# Patient Record
Sex: MALE | Race: WHITE | Employment: OTHER | URBAN - METROPOLITAN AREA
[De-identification: names, ages, dates, MRNs, and addresses within clinical notes are randomized per-mention and may not be internally consistent; named-entity substitution may affect disease eponyms.]

---

## 2017-03-15 ENCOUNTER — TELEPHONE (OUTPATIENT)
Dept: SURGERY | Facility: CLINIC | Age: 57
End: 2017-03-15

## 2017-03-15 DIAGNOSIS — L73.2 SUPPURATIVE HIDRADENITIS: Primary | ICD-10-CM

## 2017-03-15 NOTE — TELEPHONE ENCOUNTER
Reason for Call:  Other     Detailed comments: Pt has developed another cyst on tailbone that burst last night and is now draining. Also thinks he may have a low-grade temp - feeling better since it burst, but still not quite right. He has been soaking it. Wondering if he needs an antibiotic phoned in.     [facility]: [facility] [place]    Phone Number Patient can be reached at: Home number on file [phone] (home)    Best Time: Any    Can we leave a detailed message on this number? YES    Call taken on 3/15/2017 at 11:37 AM by Denise Behrendt

## 2017-03-15 NOTE — TELEPHONE ENCOUNTER
"Called and spoke with pt regarding cyst located near \"tailbone to the left\".     S-(situation): Re occurring cyst on/off for a couple weeks. Drainage yesterday-\"gross pus\" which turned to blood then clear, low grade temp yesterday with chills.    B-(background): Cyst removal last year 3/18/16 by Dr Mcneil.     A-(assessment): Afebrile today and feeling better. Pt states he has been doing epsom salt baths which has improved symptoms. Pt requesting antibiotic or further recommendations.    R-(recommendations): Instructed pt to seek emergency care if fever or other signs of infection occur.     Please review and advise.   Shey JOLLY RN  Specialty [employer]       "

## 2017-03-16 NOTE — TELEPHONE ENCOUNTER
This was just infected scar tissue last year. Would recommend a trial of Augmentin for 1 week. I sent the prescription to [facility] in [place].    Haider Mcneil MD

## 2017-03-16 NOTE — TELEPHONE ENCOUNTER
I spoke to John and let him know Dr Mcneil's message. The patient wanted the Rx to go to [facility] so I called it [facility]. rk JEWELL RN

## 2017-04-18 ENCOUNTER — TELEPHONE (OUTPATIENT)
Dept: FAMILY MEDICINE | Facility: CLINIC | Age: 57
End: 2017-04-18

## 2017-04-19 ENCOUNTER — OFFICE VISIT (OUTPATIENT)
Dept: FAMILY MEDICINE | Facility: CLINIC | Age: 57
End: 2017-04-19
Payer: COMMERCIAL

## 2017-04-19 ENCOUNTER — ANESTHESIA EVENT (OUTPATIENT)
Dept: SURGERY | Facility: CLINIC | Age: 57
End: 2017-04-19
Payer: COMMERCIAL

## 2017-04-19 ENCOUNTER — OFFICE VISIT (OUTPATIENT)
Dept: SURGERY | Facility: CLINIC | Age: 57
End: 2017-04-19
Payer: COMMERCIAL

## 2017-04-19 VITALS
DIASTOLIC BLOOD PRESSURE: 80 MMHG | WEIGHT: 212 LBS | SYSTOLIC BLOOD PRESSURE: 122 MMHG | HEART RATE: 72 BPM | TEMPERATURE: 98.1 F | BODY MASS INDEX: 28.71 KG/M2 | HEIGHT: 72 IN

## 2017-04-19 VITALS
DIASTOLIC BLOOD PRESSURE: 80 MMHG | HEIGHT: 72 IN | TEMPERATURE: 98.9 F | BODY MASS INDEX: 28.71 KG/M2 | WEIGHT: 212 LBS | SYSTOLIC BLOOD PRESSURE: 118 MMHG

## 2017-04-19 DIAGNOSIS — Z13.6 CARDIOVASCULAR SCREENING; LDL GOAL LESS THAN 130: Primary | ICD-10-CM

## 2017-04-19 DIAGNOSIS — Z13.1 SCREENING FOR DIABETES MELLITUS: ICD-10-CM

## 2017-04-19 DIAGNOSIS — Z01.810 PREOP CARDIOVASCULAR EXAM: Primary | ICD-10-CM

## 2017-04-19 DIAGNOSIS — L05.91 PILONIDAL CYST: ICD-10-CM

## 2017-04-19 DIAGNOSIS — K61.0 PERIANAL ABSCESS: ICD-10-CM

## 2017-04-19 LAB
ANION GAP SERPL CALCULATED.3IONS-SCNC: 6 MMOL/L (ref 3–14)
BUN SERPL-MCNC: 20 MG/DL (ref 7–30)
CALCIUM SERPL-MCNC: 8.9 MG/DL (ref 8.5–10.1)
CHLORIDE SERPL-SCNC: 106 MMOL/L (ref 94–109)
CHOLEST SERPL-MCNC: 201 MG/DL
CO2 SERPL-SCNC: 28 MMOL/L (ref 20–32)
CREAT SERPL-MCNC: 0.99 MG/DL (ref 0.66–1.25)
GFR SERPL CREATININE-BSD FRML MDRD: 78 ML/MIN/1.7M2
GLUCOSE SERPL-MCNC: 87 MG/DL (ref 70–99)
GRAM STN SPEC: ABNORMAL
HDLC SERPL-MCNC: 51 MG/DL
LDLC SERPL CALC-MCNC: 124 MG/DL
Lab: ABNORMAL
MICRO REPORT STATUS: ABNORMAL
NONHDLC SERPL-MCNC: 150 MG/DL
POTASSIUM SERPL-SCNC: 3.9 MMOL/L (ref 3.4–5.3)
SODIUM SERPL-SCNC: 140 MMOL/L (ref 133–144)
SPECIMEN SOURCE: ABNORMAL
TRIGL SERPL-MCNC: 128 MG/DL

## 2017-04-19 PROCEDURE — 87070 CULTURE OTHR SPECIMN AEROBIC: CPT | Performed by: NURSE PRACTITIONER

## 2017-04-19 PROCEDURE — 87205 SMEAR GRAM STAIN: CPT | Performed by: NURSE PRACTITIONER

## 2017-04-19 PROCEDURE — 36415 COLL VENOUS BLD VENIPUNCTURE: CPT | Performed by: NURSE PRACTITIONER

## 2017-04-19 PROCEDURE — 87077 CULTURE AEROBIC IDENTIFY: CPT | Performed by: NURSE PRACTITIONER

## 2017-04-19 PROCEDURE — 99214 OFFICE O/P EST MOD 30 MIN: CPT | Performed by: SURGERY

## 2017-04-19 PROCEDURE — 99213 OFFICE O/P EST LOW 20 MIN: CPT | Performed by: NURSE PRACTITIONER

## 2017-04-19 PROCEDURE — 87186 SC STD MICRODIL/AGAR DIL: CPT | Performed by: NURSE PRACTITIONER

## 2017-04-19 PROCEDURE — 80061 LIPID PANEL: CPT | Performed by: NURSE PRACTITIONER

## 2017-04-19 PROCEDURE — 80048 BASIC METABOLIC PNL TOTAL CA: CPT | Performed by: NURSE PRACTITIONER

## 2017-04-19 NOTE — MR AVS SNAPSHOT
After Visit Summary   4/19/2017    John Kinsey    MRN: [id number]           Patient Information     Date Of Birth          1960        Visit Information        Provider Department      4/19/2017 8:00 AM Ana Gaytan APRN CNP [facility]        Today's Diagnoses     CARDIOVASCULAR SCREENING; LDL GOAL LESS THAN 130    -  1    Screening for diabetes mellitus          Care Instructions      1. Labs today    Thank you for choosing [facility].  You may be receiving a survey in the mail from Obinna Torres regarding your visit today.  Please take a few minutes to complete and return the survey to let us know how we are doing.      If you have questions or concerns, please contact us via Encore Alert or you can contact your care team at [phone].    Our Clinic hours are:  Monday 6:40 am  to 7:00 pm  Tuesday -Friday 6:40 am to 5:00 pm    The [place] outpatient lab hours are:  Monday - Friday 6:10 am to 4:45 pm  Saturdays 7:00 am to 11:00 am  Appointments are required, call [phone]    If you have clinical questions after hours or would like to schedule an appointment,  call the clinic at [phone].        Follow-ups after your visit        Who to contact     If you have questions or need follow up information about today's clinic visit or your schedule please contact [facility] directly at [phone].  Normal or non-critical lab and imaging results will be communicated to you by ProtoStarhart, letter or phone within 4 business days after the clinic has received the results. If you do not hear from us within 7 days, please contact the clinic through [employer] or phone. If you have a critical or abnormal lab result, we will notify you by phone as soon as possible.  Submit refill requests through En[employer] Alert or call your pharmacy and they will forward the refill request to us. Please allow 3 business days for your refill to be completed.          Additional Information About  "Your Visit        MyChart Information     [employer] lets you send messages to your doctor, view your test results, renew your prescriptions, schedule appointments and more. To sign up, go to [email or web address] . Click on \"Log in\" on the left side of the screen, which will take you to the Welcome page. Then click on \"Sign up Now\" on the right side of the page.     You will be asked to enter the access code listed below, as well as some personal information. Please follow the directions to create your username and password.     Your access code is: [email or web address]  Expires: 2017  8:25 AM     Your access code will  in 90 days. If you need help or a new code, please call your [facility] clinic or [phone].        Care EveryWhere ID     This is your Care EveryWhere ID. This could be used by other organizations to access your [place] medical records  [id number]        Your Vitals Were     Temperature Height BMI (Body Mass Index)             98.9  F (37.2  C) (Tympanic) 6' (1.829 m) 28.75 kg/m2          Blood Pressure from Last 3 Encounters:   17 118/80   16 124/77   16 124/81    Weight from Last 3 Encounters:   17 212 lb (96.2 kg)   16 212 lb (96.2 kg)   10/13/15 211 lb (95.7 kg)              We Performed the Following     Basic metabolic panel     Lipid Profile with reflex to direct LDL        Primary Care Provider Office Phone # Fax #    Indira Maya [phone]       [facility] [address] [address]        Thank you!     Thank you for choosing [facility]  for your care. Our goal is always to provide you with excellent care. Hearing back from our patients is one way we can continue to improve our services. Please take a few minutes to complete the written survey that you may receive in the mail after your visit with us. Thank you!             Your Updated Medication List - Protect others around you: Learn how to safely use, " store and throw away your medicines at www.disposemymeds.org.          This list is accurate as of: 4/19/17  8:25 AM.  Always use your most recent med list.                   Brand Name Dispense Instructions for use    amoxicillin-clavulanate 875-125 MG per tablet    AUGMENTIN    14 tablet    Take 1 tablet by mouth 2 times daily

## 2017-04-19 NOTE — NURSING NOTE
Initial /80 (BP Location: Left arm, Patient Position: Chair, Cuff Size: Adult Large)  Temp 98.9  F (37.2  C) (Tympanic)  Ht 6' (1.829 m)  Wt 212 lb (96.2 kg)  BMI 28.75 kg/m2 Estimated body mass index is 28.75 kg/(m^2) as calculated from the following:    Height as of this encounter: 6' (1.829 m).    Weight as of this encounter: 212 lb (96.2 kg). .    Ama Elizabeth

## 2017-04-19 NOTE — PROGRESS NOTES
56-year-old male complaining of perianal drainage off and on for the past several weeks. Patient reports he will have some irritation in the area of posterior midline and it will spontaneously drain. It causes some localized discomfort. Patient has history of suppurative hidradenitis    Patient Active Problem List   Diagnosis     Anal fissure       Past Medical History:   Diagnosis Date     Anal fistula 3/20/13     PONV (postoperative nausea and vomiting)        Past Surgical History:   Procedure Laterality Date     COLONOSCOPY  4/11/2013    Procedure: COLONOSCOPY;  Diagnostic Colonoscopy,Anal Exam, Anal Fistulotomy ;  Surgeon: Haider Deleon MD;  Location: [facility] OR     ENT SURGERY      T&A     EXCISE HIDRADENITIS (LOCATION)  10/10/2013    Procedure: EXCISE HIDRADENITIS (LOCATION);  Excision of bilateral axillary scarring;  Surgeon: Haiedr Mcneil MD;  Location: [facility] OR     FISTULOTOMY RECTUM  4/11/2013    Procedure: FISTULOTOMY RECTUM;;  Surgeon: Haider Deleon MD;  Location: [facility] OR     HYDROCELECTOMY SCROTAL N/A 10/13/2015    Procedure: HYDROCELECTOMY SCROTAL;  Surgeon: VINCENT Huber MD;  Location: [facility] OR     ORTHOPEDIC SURGERY  3/24/12    Left shoulder surgery       Family History   Problem Relation Age of Onset     Colon Polyps Father      Colon Cancer No family hx of      Crohn Disease No family hx of      Ulcerative Colitis No family hx of      Anesthesia Reaction No family hx of        Social History   Substance Use Topics     Smoking status: Former Smoker     Types: Cigarettes     Smokeless tobacco: Never Used     Alcohol use Yes      Comment: social        Smoking - Counseled patient on the effects of smoking on surgical issues such as wound healing and infection rates.    History   Drug Use No       Current Outpatient Prescriptions   Medication Sig Dispense Refill     amoxicillin-clavulanate (AUGMENTIN) 875-125 MG per tablet Take 1 tablet by mouth 2 times daily for 7 days 14 tablet 0      amoxicillin-clavulanate (AUGMENTIN) 875-125 MG per tablet Take 1 tablet by mouth 2 times daily (Patient not taking: Reported on 4/19/2017) 14 tablet 0       Allergies   Allergen Reactions     Nkda [No Known Drug Allergies]        ROS  Constitutional - Denies fevers, weight loss, malaise, lethargy  Neuro - Denies tremors or seizures  Pulmon - Denies SOB, dyspnea, hemoptysis, chronic cough or use of an inhaler  CV - Denies CP, SOB, lower extremity edema, difficulty w/ stairs, has never used NTG  GI - Denies hematemesis, BRBPR, melena, chronic diarrhea or epigastric pain   - Denies hematuria, difficulty voiding, h/o STDs  Hematology - Denies blood clotting disorders, chronic anemias  Dermatology - No melanomas or skin cancers  Rheumatology - No h/o RA  Pysch - Denies depression, bipolar d/o or schizophrenia    Exam:  AXO3 NAD  Neuro - Strength 5/5 all major groups, sensation intact, PERRL  Lungs - CTA  CV - RRR  Abd - Soft, non-distended, non-tender, +BS  Rectal-areas of scarring in around posterior anus with possible pit. Could not express purulence  Extr - No edema    Assessment and plan: 56-year-old male with history consistent with perianal fistula. This is consistent with patient's history of hidradenitis. Patient would benefit from a rectal exam under anesthesia and possible fistulotomy. Risks benefits alternatives and complications were discussed with the patient including the possibility of infection bleeding or recurrence. Patient understood and wished to proceed. I have ordered an EKG. PATIENT IS CLEARED FOR SURGERY.    Haider Mcneil MD

## 2017-04-19 NOTE — MR AVS SNAPSHOT
"              After Visit Summary   2017    John Kinsey    MRN: [id number]           Patient Information     Date Of Birth          1960        Visit Information        Provider Department      2017 9:00 AM Haider Mcneil MD [facility]        Today's Diagnoses     Preop cardiovascular exam    -  1    Perianal abscess          Care Instructions    Per Dr. Mcneil's instructions        Follow-ups after your visit        Who to contact     If you have questions or need follow up information about today's clinic visit or your schedule please contact [facility] directly at [phone].  Normal or non-critical lab and imaging results will be communicated to you by Plehn Analyticshart, letter or phone within 4 business days after the clinic has received the results. If you do not hear from us within 7 days, please contact the clinic through [employer] or phone. If you have a critical or abnormal lab result, we will notify you by phone as soon as possible.  Submit refill requests through [employer] or call your pharmacy and they will forward the refill request to us. Please allow 3 business days for your refill to be completed.          Additional Information About Your Visit        MyChart Information     [employer] lets you send messages to your doctor, view your test results, renew your prescriptions, schedule appointments and more. To sign up, go to [email or web address] . Click on \"Log in\" on the left side of the screen, which will take you to the Welcome page. Then click on \"Sign up Now\" on the right side of the page.     You will be asked to enter the access code listed below, as well as some personal information. Please follow the directions to create your username and password.     Your access code is: [email or web address]  Expires: 2017  8:25 AM     Your access code will  in 90 days. If you need help or a new code, please call your [facility] or [phone].        Care EveryWhere " ID     This is your Care EveryWhere ID. This could be used by other organizations to access your [place] medical records  [id number]        Your Vitals Were     Pulse Temperature Height BMI (Body Mass Index)          72 98.1  F (36.7  C) (Oral) 1.829 m (6') 28.75 kg/m2         Blood Pressure from Last 3 Encounters:   04/21/17 118/60   04/19/17 122/80   04/19/17 118/80    Weight from Last 3 Encounters:   04/21/17 95.3 kg (210 lb)   04/19/17 96.2 kg (212 lb)   04/19/17 96.2 kg (212 lb)              We Performed the Following     EKG 12-lead complete w/read - Clinics        Primary Care Provider Office Phone # Fax #    Indira Prince Francesco [phone]       [facility] [address] [address]        Thank you!     Thank you for choosing [facility]  for your care. Our goal is always to provide you with excellent care. Hearing back from our patients is one way we can continue to improve our services. Please take a few minutes to complete the written survey that you may receive in the mail after your visit with us. Thank you!             Your Updated Medication List - Protect others around you: Learn how to safely use, store and throw away your medicines at www.disposemymeds.org.          This list is accurate as of: 4/19/17 11:59 PM.  Always use your most recent med list.                   Brand Name Dispense Instructions for use    amoxicillin-clavulanate 875-125 MG per tablet    AUGMENTIN         * HYDROcodone-acetaminophen  MG per tablet    NORCO     TAKE 1 TABLET BY MOUTH EVERY 6 HOURS AS NEEDED FOR PAIN       * HYDROcodone-acetaminophen  MG per tablet    NORCO    45 tablet    Take 1-2 tablets by mouth every 6 hours as needed for moderate to severe pain       * Notice:  This list has 2 medication(s) that are the same as other medications prescribed for you. Read the directions carefully, and ask your doctor or other care provider to review them with you.

## 2017-04-19 NOTE — NURSING NOTE
Initial /80 (BP Location: Right arm, Patient Position: Chair, Cuff Size: Adult Regular)  Pulse 72  Temp 98.1  F (36.7  C) (Oral)  Ht 1.829 m (6')  Wt 96.2 kg (212 lb)  BMI 28.75 kg/m2 Estimated body mass index is 28.75 kg/(m^2) as calculated from the following:    Height as of this encounter: 1.829 m (6').    Weight as of this encounter: 96.2 kg (212 lb). .    Claudia Chino MA

## 2017-04-19 NOTE — PATIENT INSTRUCTIONS
1. Labs today    Thank you for choosing [facility].  You may be receiving a survey in the mail from Obinna Torres regarding your visit today.  Please take a few minutes to complete and return the survey to let us know how we are doing.      If you have questions or concerns, please contact us via [employer] or you can contact your care team at [phone].    Our Clinic hours are:  Monday 6:40 am  to 7:00 pm  Tuesday -Friday 6:40 am to 5:00 pm    The [place] outpatient lab hours are:  Monday - Friday 6:10 am to 4:45 pm  Saturdays 7:00 am to 11:00 am  Appointments are required, call [phone]    If you have clinical questions after hours or would like to schedule an appointment,  call the clinic at [phone].

## 2017-04-19 NOTE — PROGRESS NOTES
SUBJECTIVE:                                                    John Kinsey is a 56 year old male who presents to clinic today for the following health issues:    Patient with history of pilonidal cyst - was seen by Dr. Deleon for fistula in 2013. He states that cyst continue to come back intermittently. Cyst is hard and will drain at times. He completed antibiotics called in for him from Dr. Mcneil. Symptoms are still present after completing antibiotics however they are improved. History of hidradenitis.     Patient would like to be checked for diabetes and cholesterol.       -------------------------------------    Problem list and histories reviewed & adjusted, as indicated.  Additional history: as documented    Patient Active Problem List   Diagnosis     Anal fissure     Past Surgical History:   Procedure Laterality Date     COLONOSCOPY  4/11/2013    Procedure: COLONOSCOPY;  Diagnostic Colonoscopy,Anal Exam, Anal Fistulotomy ;  Surgeon: Haider Deleon MD;  Location: [facility] OR     ENT SURGERY      T&A     EXCISE HIDRADENITIS (LOCATION)  10/10/2013    Procedure: EXCISE HIDRADENITIS (LOCATION);  Excision of bilateral axillary scarring;  Surgeon: Haider Mcneil MD;  Location: [facility] OR     FISTULOTOMY RECTUM  4/11/2013    Procedure: FISTULOTOMY RECTUM;;  Surgeon: Haider Deleon MD;  Location: [facility]     HYDROCELECTOMY SCROTAL N/A 10/13/2015    Procedure: HYDROCELECTOMY SCROTAL;  Surgeon: VINCENT Huber MD;  Location: [facility] OR     ORTHOPEDIC SURGERY  3/24/12    Left shoulder surgery       Social History   Substance Use Topics     Smoking status: Former Smoker     Types: Cigarettes     Smokeless tobacco: Never Used     Alcohol use Yes      Comment: social     Family History   Problem Relation Age of Onset     Colon Polyps Father      Colon Cancer No family hx of      Crohn Disease No family hx of      Ulcerative Colitis No family hx of      Anesthesia Reaction No family hx of            Reviewed and  updated as needed this visit by clinical staff       Reviewed and updated as needed this visit by Provider         ROS:  Constitutional, HEENT, cardiovascular, pulmonary, GI, , musculoskeletal, neuro, skin, endocrine and psych systems are negative, except as otherwise noted.    OBJECTIVE:                                                    /80 (BP Location: Left arm, Patient Position: Chair, Cuff Size: Adult Large)  Temp 98.9  F (37.2  C) (Tympanic)  Ht 6' (1.829 m)  Wt 212 lb (96.2 kg)  BMI 28.75 kg/m2  Body mass index is 28.75 kg/(m^2).  GENERAL: healthy, alert and no distress  RESP: unlabored  RECTAL (male): left gluteal/ near anus with tender internal cyst.   MS: no gross musculoskeletal defects noted, no edema    Diagnostic Test Results:  none      ASSESSMENT/PLAN:                                                        1. Pilonidal cyst  Appointment made today with Dr. Mcneil for possible removal of cyst-   - amoxicillin-clavulanate (AUGMENTIN) 875-125 MG per tablet; Take 1 tablet by mouth 2 times daily for 7 days  Dispense: 14 tablet; Refill: 0  - Wound Culture Aerobic Bacterial  - Gram stain    2. CARDIOVASCULAR SCREENING; LDL GOAL LESS THAN 130    - Lipid Profile with reflex to direct LDL    3. Screening for diabetes mellitus  + family history of diabetes  - Basic metabolic panel        BERNARDO Trammell [facility]

## 2017-04-19 NOTE — LETTER
SURGERYPLANNING/SCHEDULING WORKSHEET                              [facility] [place]  [address] [address] [phone]                          John Kinsey                :  1960  MRN:  [id number]  Phone: [phone] (home)     Same Day Surgery   Surgeon: Haider Mcneil MD  Diagnosis:   Anal fistula  Allergies:  Nkda [no known drug allergies]   A preoperative evaluation and physical will be done by this office.   ====================================================  Surgical Procedure:  General Surgery:recatl exam under anesthesia and fistulotomy  Length of Procedure:  30 minutes  Type of anesthesia:  General  The proposed surgical procedure is considered LOW risk.  Date of Procedure:___17_____________    Time: __10am  ___________________       Special Equipment: None  Informed Consent Obtained and Signed:  NO  ====================================================  Instructions to Same Day Surgery Staff  Pneumoboots  Preop Antibiotic:  Ancef 2 gm IV  pre-op within one hour prior to incision For > 80 kg  Preop Pain Meds:  None  Preop Orders:  Routine Standing Orders.  ====================================================  Instructions to the patient:  Preop physical exam scheduled (within 30 days or 7 days prior) with:  Dr. Escudero update___________________  Clinic:  ____________________                                         Date______________Time_________________________  Come to the hospital at: __ONE HOUR PRIOR- 9am  _____________________________  HOME PREPARATION:   Shower with Hibiclens the night before or the morning of surgery, gently cleaning skin from neck to feet  Bathe and brush teeth the morning of surgery.  Take medications with a sip of water the morning of surgery:   Check with  if taking insulin.  May have  a light meal, toast and clear liquids, up to 8 hrs before surgery  May have clear liquids  (liquids one can read through) up to 4 hrs before surgery  NOTHING after 4 hrs before surgery  Stop aspirin 7-10 days before surgery  Stop NSAIDS (Ibuproven, Naproxen, etc) 5 days before surgery  Stop Plavix 7-10 days before surgery      Haider Mcneil MD    4/19/2017  This form was electronically signed at chart closure                                                                        Chart Copy

## 2017-04-21 ENCOUNTER — ANESTHESIA (OUTPATIENT)
Dept: SURGERY | Facility: CLINIC | Age: 57
End: 2017-04-21
Payer: COMMERCIAL

## 2017-04-21 ENCOUNTER — HOSPITAL ENCOUNTER (OUTPATIENT)
Facility: CLINIC | Age: 57
Discharge: HOME | End: 2017-04-21
Attending: SURGERY | Admitting: SURGERY
Payer: COMMERCIAL

## 2017-04-21 ENCOUNTER — SURGERY (OUTPATIENT)
Age: 57
End: 2017-04-21

## 2017-04-21 VITALS
SYSTOLIC BLOOD PRESSURE: 118 MMHG | DIASTOLIC BLOOD PRESSURE: 60 MMHG | HEART RATE: 78 BPM | TEMPERATURE: 97.9 F | RESPIRATION RATE: 20 BRPM | WEIGHT: 210 LBS | BODY MASS INDEX: 27.83 KG/M2 | HEIGHT: 73 IN | OXYGEN SATURATION: 95 %

## 2017-04-21 DIAGNOSIS — G89.18 POST-OP PAIN: Primary | ICD-10-CM

## 2017-04-21 LAB
BACTERIA SPEC CULT: ABNORMAL
Lab: ABNORMAL
MICRO REPORT STATUS: ABNORMAL
MICROORGANISM SPEC CULT: ABNORMAL
SPECIMEN SOURCE: ABNORMAL

## 2017-04-21 PROCEDURE — 25000128 H RX IP 250 OP 636: Performed by: SURGERY

## 2017-04-21 PROCEDURE — 25000132 ZZH RX MED GY IP 250 OP 250 PS 637: Performed by: SURGERY

## 2017-04-21 PROCEDURE — 25000125 ZZHC RX 250: Performed by: NURSE ANESTHETIST, CERTIFIED REGISTERED

## 2017-04-21 PROCEDURE — 27210794 ZZH OR GENERAL SUPPLY STERILE: Performed by: SURGERY

## 2017-04-21 PROCEDURE — 71000027 ZZH RECOVERY PHASE 2 EACH 15 MINS: Performed by: SURGERY

## 2017-04-21 PROCEDURE — 36000052 ZZH SURGERY LEVEL 2 EA 15 ADDTL MIN: Performed by: SURGERY

## 2017-04-21 PROCEDURE — 25800025 ZZH RX 258: Performed by: NURSE ANESTHETIST, CERTIFIED REGISTERED

## 2017-04-21 PROCEDURE — 46270 REMOVE ANAL FIST SUBQ: CPT | Performed by: SURGERY

## 2017-04-21 PROCEDURE — 27110028 ZZH OR GENERAL SUPPLY NON-STERILE: Performed by: SURGERY

## 2017-04-21 PROCEDURE — 37000008 ZZH ANESTHESIA TECHNICAL FEE, 1ST 30 MIN: Performed by: SURGERY

## 2017-04-21 PROCEDURE — 40000305 ZZH STATISTIC PRE PROC ASSESS I: Performed by: SURGERY

## 2017-04-21 PROCEDURE — C9290 INJ, BUPIVACAINE LIPOSOME: HCPCS | Performed by: SURGERY

## 2017-04-21 PROCEDURE — 36000050 ZZH SURGERY LEVEL 2 1ST 30 MIN: Performed by: SURGERY

## 2017-04-21 PROCEDURE — 37000009 ZZH ANESTHESIA TECHNICAL FEE, EACH ADDTL 15 MIN: Performed by: SURGERY

## 2017-04-21 PROCEDURE — 71000012 ZZH RECOVERY PHASE 1 LEVEL 1 FIRST HR: Performed by: SURGERY

## 2017-04-21 PROCEDURE — 25000128 H RX IP 250 OP 636: Performed by: NURSE ANESTHETIST, CERTIFIED REGISTERED

## 2017-04-21 RX ORDER — CEFAZOLIN SODIUM 1 G/3ML
1 INJECTION, POWDER, FOR SOLUTION INTRAMUSCULAR; INTRAVENOUS SEE ADMIN INSTRUCTIONS
Status: DISCONTINUED | OUTPATIENT
Start: 2017-04-21 | End: 2017-04-21 | Stop reason: HOSPADM

## 2017-04-21 RX ORDER — MEPERIDINE HYDROCHLORIDE 25 MG/ML
12.5 INJECTION INTRAMUSCULAR; INTRAVENOUS; SUBCUTANEOUS
Status: DISCONTINUED | OUTPATIENT
Start: 2017-04-21 | End: 2017-04-21 | Stop reason: HOSPADM

## 2017-04-21 RX ORDER — DEXAMETHASONE SODIUM PHOSPHATE 4 MG/ML
4 INJECTION, SOLUTION INTRA-ARTICULAR; INTRALESIONAL; INTRAMUSCULAR; INTRAVENOUS; SOFT TISSUE EVERY 10 MIN PRN
Status: DISCONTINUED | OUTPATIENT
Start: 2017-04-21 | End: 2017-04-21 | Stop reason: HOSPADM

## 2017-04-21 RX ORDER — PHYSOSTIGMINE SALICYLATE 1 MG/ML
1.2 INJECTION INTRAVENOUS
Status: DISCONTINUED | OUTPATIENT
Start: 2017-04-21 | End: 2017-04-21 | Stop reason: HOSPADM

## 2017-04-21 RX ORDER — GLYCOPYRROLATE 0.2 MG/ML
INJECTION, SOLUTION INTRAMUSCULAR; INTRAVENOUS PRN
Status: DISCONTINUED | OUTPATIENT
Start: 2017-04-21 | End: 2017-04-21

## 2017-04-21 RX ORDER — METOCLOPRAMIDE 10 MG/1
10 TABLET ORAL EVERY 6 HOURS PRN
Status: DISCONTINUED | OUTPATIENT
Start: 2017-04-21 | End: 2017-04-21 | Stop reason: HOSPADM

## 2017-04-21 RX ORDER — PROPOFOL 10 MG/ML
INJECTION, EMULSION INTRAVENOUS CONTINUOUS PRN
Status: DISCONTINUED | OUTPATIENT
Start: 2017-04-21 | End: 2017-04-21

## 2017-04-21 RX ORDER — HYDROCODONE BITARTRATE AND ACETAMINOPHEN 10; 325 MG/1; MG/1
1-2 TABLET ORAL EVERY 6 HOURS PRN
Qty: 45 TABLET | Refills: 0 | Status: SHIPPED | OUTPATIENT
Start: 2017-04-21 | End: 2017-10-11

## 2017-04-21 RX ORDER — SODIUM CHLORIDE, SODIUM LACTATE, POTASSIUM CHLORIDE, CALCIUM CHLORIDE 600; 310; 30; 20 MG/100ML; MG/100ML; MG/100ML; MG/100ML
INJECTION, SOLUTION INTRAVENOUS CONTINUOUS
Status: DISCONTINUED | OUTPATIENT
Start: 2017-04-21 | End: 2017-04-21 | Stop reason: HOSPADM

## 2017-04-21 RX ORDER — ONDANSETRON 2 MG/ML
4 INJECTION INTRAMUSCULAR; INTRAVENOUS EVERY 30 MIN PRN
Status: DISCONTINUED | OUTPATIENT
Start: 2017-04-21 | End: 2017-04-21 | Stop reason: HOSPADM

## 2017-04-21 RX ORDER — HYDROCODONE BITARTRATE AND ACETAMINOPHEN 10; 325 MG/1; MG/1
TABLET ORAL
Refills: 0 | COMMUNITY
Start: 2017-03-30 | End: 2017-10-11

## 2017-04-21 RX ORDER — HYDROMORPHONE HYDROCHLORIDE 1 MG/ML
.3-.5 INJECTION, SOLUTION INTRAMUSCULAR; INTRAVENOUS; SUBCUTANEOUS EVERY 10 MIN PRN
Status: DISCONTINUED | OUTPATIENT
Start: 2017-04-21 | End: 2017-04-21 | Stop reason: HOSPADM

## 2017-04-21 RX ORDER — PROMETHAZINE HYDROCHLORIDE 25 MG/ML
12.5 INJECTION, SOLUTION INTRAMUSCULAR; INTRAVENOUS
Status: DISCONTINUED | OUTPATIENT
Start: 2017-04-21 | End: 2017-04-21 | Stop reason: HOSPADM

## 2017-04-21 RX ORDER — ONDANSETRON 4 MG/1
4 TABLET, ORALLY DISINTEGRATING ORAL EVERY 30 MIN PRN
Status: DISCONTINUED | OUTPATIENT
Start: 2017-04-21 | End: 2017-04-21 | Stop reason: HOSPADM

## 2017-04-21 RX ORDER — HYDROCODONE BITARTRATE AND ACETAMINOPHEN 5; 325 MG/1; MG/1
1-2 TABLET ORAL EVERY 6 HOURS PRN
Status: DISCONTINUED | OUTPATIENT
Start: 2017-04-21 | End: 2017-04-21 | Stop reason: HOSPADM

## 2017-04-21 RX ORDER — FENTANYL CITRATE 50 UG/ML
INJECTION, SOLUTION INTRAMUSCULAR; INTRAVENOUS PRN
Status: DISCONTINUED | OUTPATIENT
Start: 2017-04-21 | End: 2017-04-21

## 2017-04-21 RX ORDER — NEOSTIGMINE METHYLSULFATE 1 MG/ML
VIAL (ML) INJECTION PRN
Status: DISCONTINUED | OUTPATIENT
Start: 2017-04-21 | End: 2017-04-21

## 2017-04-21 RX ORDER — CEFAZOLIN SODIUM 2 G/100ML
2 INJECTION, SOLUTION INTRAVENOUS
Status: COMPLETED | OUTPATIENT
Start: 2017-04-21 | End: 2017-04-21

## 2017-04-21 RX ORDER — NALOXONE HYDROCHLORIDE 0.4 MG/ML
.1-.4 INJECTION, SOLUTION INTRAMUSCULAR; INTRAVENOUS; SUBCUTANEOUS
Status: DISCONTINUED | OUTPATIENT
Start: 2017-04-21 | End: 2017-04-21 | Stop reason: HOSPADM

## 2017-04-21 RX ORDER — ONDANSETRON 2 MG/ML
INJECTION INTRAMUSCULAR; INTRAVENOUS PRN
Status: DISCONTINUED | OUTPATIENT
Start: 2017-04-21 | End: 2017-04-21

## 2017-04-21 RX ORDER — LIDOCAINE HYDROCHLORIDE 10 MG/ML
INJECTION, SOLUTION INFILTRATION; PERINEURAL PRN
Status: DISCONTINUED | OUTPATIENT
Start: 2017-04-21 | End: 2017-04-21

## 2017-04-21 RX ORDER — LIDOCAINE 40 MG/G
CREAM TOPICAL
Status: DISCONTINUED | OUTPATIENT
Start: 2017-04-21 | End: 2017-04-21 | Stop reason: HOSPADM

## 2017-04-21 RX ORDER — PROPOFOL 10 MG/ML
INJECTION, EMULSION INTRAVENOUS PRN
Status: DISCONTINUED | OUTPATIENT
Start: 2017-04-21 | End: 2017-04-21

## 2017-04-21 RX ORDER — SCOLOPAMINE TRANSDERMAL SYSTEM 1 MG/1
1 PATCH, EXTENDED RELEASE TRANSDERMAL ONCE
Status: COMPLETED | OUTPATIENT
Start: 2017-04-21 | End: 2017-04-21

## 2017-04-21 RX ORDER — DEXAMETHASONE SODIUM PHOSPHATE 4 MG/ML
INJECTION, SOLUTION INTRA-ARTICULAR; INTRALESIONAL; INTRAMUSCULAR; INTRAVENOUS; SOFT TISSUE PRN
Status: DISCONTINUED | OUTPATIENT
Start: 2017-04-21 | End: 2017-04-21

## 2017-04-21 RX ORDER — METOCLOPRAMIDE HYDROCHLORIDE 5 MG/ML
10 INJECTION INTRAMUSCULAR; INTRAVENOUS EVERY 6 HOURS PRN
Status: DISCONTINUED | OUTPATIENT
Start: 2017-04-21 | End: 2017-04-21 | Stop reason: HOSPADM

## 2017-04-21 RX ORDER — ALBUTEROL SULFATE 0.83 MG/ML
2.5 SOLUTION RESPIRATORY (INHALATION) EVERY 4 HOURS PRN
Status: DISCONTINUED | OUTPATIENT
Start: 2017-04-21 | End: 2017-04-21 | Stop reason: HOSPADM

## 2017-04-21 RX ORDER — FENTANYL CITRATE 50 UG/ML
25-50 INJECTION, SOLUTION INTRAMUSCULAR; INTRAVENOUS
Status: DISCONTINUED | OUTPATIENT
Start: 2017-04-21 | End: 2017-04-21 | Stop reason: HOSPADM

## 2017-04-21 RX ADMIN — PROPOFOL 200 MCG/KG/MIN: 10 INJECTION, EMULSION INTRAVENOUS at 10:11

## 2017-04-21 RX ADMIN — PROPOFOL 200 MG: 10 INJECTION, EMULSION INTRAVENOUS at 10:08

## 2017-04-21 RX ADMIN — Medication 4 MG: at 10:30

## 2017-04-21 RX ADMIN — MIDAZOLAM HYDROCHLORIDE 2 MG: 1 INJECTION, SOLUTION INTRAMUSCULAR; INTRAVENOUS at 10:04

## 2017-04-21 RX ADMIN — HYDROCODONE BITARTRATE AND ACETAMINOPHEN 2 TABLET: 5; 325 TABLET ORAL at 11:40

## 2017-04-21 RX ADMIN — ROCURONIUM BROMIDE 10 MG: 10 INJECTION INTRAVENOUS at 10:08

## 2017-04-21 RX ADMIN — SODIUM CHLORIDE, POTASSIUM CHLORIDE, SODIUM LACTATE AND CALCIUM CHLORIDE: 600; 310; 30; 20 INJECTION, SOLUTION INTRAVENOUS at 09:45

## 2017-04-21 RX ADMIN — BUPIVACAINE 20 ML: 13.3 INJECTION, SUSPENSION, LIPOSOMAL INFILTRATION at 10:31

## 2017-04-21 RX ADMIN — GLYCOPYRROLATE 0.6 MG: 0.2 INJECTION, SOLUTION INTRAMUSCULAR; INTRAVENOUS at 10:30

## 2017-04-21 RX ADMIN — LIDOCAINE HYDROCHLORIDE 100 MG: 10 INJECTION, SOLUTION INFILTRATION; PERINEURAL at 10:08

## 2017-04-21 RX ADMIN — FENTANYL CITRATE 250 MCG: 50 INJECTION, SOLUTION INTRAMUSCULAR; INTRAVENOUS at 10:08

## 2017-04-21 RX ADMIN — SCOPOLAMINE 1 PATCH: 1 PATCH, EXTENDED RELEASE TRANSDERMAL at 09:56

## 2017-04-21 RX ADMIN — DEXAMETHASONE SODIUM PHOSPHATE 8 MG: 4 INJECTION, SOLUTION INTRA-ARTICULAR; INTRALESIONAL; INTRAMUSCULAR; INTRAVENOUS; SOFT TISSUE at 10:19

## 2017-04-21 RX ADMIN — ONDANSETRON 8 MG: 2 INJECTION INTRAMUSCULAR; INTRAVENOUS at 10:19

## 2017-04-21 RX ADMIN — LIDOCAINE HYDROCHLORIDE 1 ML: 10 INJECTION, SOLUTION INFILTRATION; PERINEURAL at 09:45

## 2017-04-21 RX ADMIN — GLYCOPYRROLATE 0.2 MG: 0.2 INJECTION, SOLUTION INTRAMUSCULAR; INTRAVENOUS at 10:04

## 2017-04-21 RX ADMIN — CEFAZOLIN SODIUM 2 G: 2 INJECTION, SOLUTION INTRAVENOUS at 10:04

## 2017-04-21 ASSESSMENT — LIFESTYLE VARIABLES: TOBACCO_USE: 1

## 2017-04-21 NOTE — IP AVS SNAPSHOT
MRN:[id number]                      After Visit Summary   4/21/2017    John Kinsey    MRN: [id number]           Thank you!     Thank you for choosing [facility] for your care. Our goal is always to provide you with excellent care. Hearing back from our patients is one way we can continue to improve our services. Please take a few minutes to complete the written survey that you may receive in the mail after you visit with us. Thank you!        Patient Information     Date Of Birth          1960        About your hospital stay     You were admitted on:  April 21, 2017 You last received care in the:  [facility] PACU    You were discharged on:  April 21, 2017       Who to Call     For medical emergencies, please call 911.  For non-urgent questions about your medical care, please call your primary care provider or clinic, [phone]  For questions related to your surgery, please call your surgery clinic        Attending Provider     Provider Haider Resendiz MD Surgery       Primary Care Provider Office Phone # Fax #    Indira Maya [phone]       [address] [address]        Further instructions from your care team       Soak incision daily with soap and water. Some mild redness or swelling is expected. If draining, cover with dry gauze.    Anal canal gauze sponge will fall out with first BM or sooner.  No need to replace.    No lifting restrictions.    Okay to use ice pack over wound as necessary for comfort.    Use pain medication as necessary but avoid constipating side effects. Ibuprofen okay but avoid Tylenol as your pain medication already contains this.    Diet as tolerated. No restrictions.    Follow up with Dr Mcneil in 1-2 weeks.    Most people take the rest of the week off and return to work the following Monday. You may return sooner as pain allows. During your follow-up appointment, Dr. Mcneil will give you a formal  letter for your work with any restrictions detailed.  All disability or other such paperwork will be addressed at that time.                                Same Day Surgery Discharge Instructions  Special Precautions After Surgery - Adult    1. It is not unusual to feel lightheaded or faint, up to 24 hours after surgery or while taking pain medication.  If you have these symptoms; sit for a few minutes before standing and have someone assist you when getting up.  2. You should rest and relax for the next 24 hours and must have someone stay with you for at least 24 hours after your discharge.  3. DO NOT DRIVE any vehicle or operate mechanical equipment for 24 hours following the end of your surgery.  DO NOT DRIVE while taking narcotic pain medications that have been prescribed by your physician.  If you had a limb operated on, you must be able to use it fully to drive.  4. DO NOT drink alcoholic beverages for 24 hours following surgery or while taking prescription pain medication.  5. Drink clear liquids (apple juice, ginger ale, broth, 7-Up, etc.).  Progress to your regular diet as you feel able.  6. Any questions call your physician and do not make important decisions for 24 hours.        __________________________________________________________________________________________________________________________________  IMPORTANT NUMBERS:    [facility] Main Number:  [phone], [phone]  Pharmacy:  [phone]  Same Day Surgery:  [phone], Monday - Friday until 8:30 p.m.  Urgent Care:  [phone]  Emergency Room:  [phone]      [facility]:  [phone]                                                                             [facility]:  [phone] option 1  [facility]:  [phone]     OB Clinic:  [phone]   Surgery Specialty Clinic:  [phone]   Home Medical Equipment: [phone]  [facility]:  [phone]      - Patient took 2  "tablets of Norco 5-325mg today at 11:40 AM.     Pending Results     No orders found from 2017 to 2017.            Admission Information     Date & Time Provider Department Dept. Phone    2017 Haider Mcneil MD [facility] PACU [phone]      Your Vitals Were     Blood Pressure Pulse Temperature Respirations Height Weight    122/82 78 97.6  F (36.4  C) (Oral) 16 1.854 m (6' 1\") 95.3 kg (210 lb)    Pulse Oximetry BMI (Body Mass Index)                97% 27.71 kg/m2          MyChart Information     [email or web address] lets you send messages to your doctor, view your test results, renew your prescriptions, schedule appointments and more. To sign up, go to [email or web address] . Click on \"Log in\" on the left side of the screen, which will take you to the Welcome page. Then click on \"Sign up Now\" on the right side of the page.     You will be asked to enter the access code listed below, as well as some personal information. Please follow the directions to create your username and password.     Your access code is: [email or web address]  Expires: 2017  8:25 AM     Your access code will  in 90 days. If you need help or a new code, please call your [facility] clinic or [phone].        Care EveryWhere ID     This is your Care EveryWhere ID. This could be used by other organizations to access your [facility] medical records  [id number]           Review of your medicines      CONTINUE these medicines which may have CHANGED, or have new prescriptions. If we are uncertain of the size of tablets/capsules you have at home, strength may be listed as something that might have changed.        Dose / Directions    * HYDROcodone-acetaminophen  MG per tablet   Commonly known as:  NORCO   This may have changed:  Another medication with the same name was added. Make sure you understand how and when to take each.        TAKE 1 TABLET BY MOUTH EVERY 6 HOURS AS NEEDED FOR PAIN   Refills:  0       * " HYDROcodone-acetaminophen  MG per tablet   Commonly known as:  NORCO   This may have changed:  You were already taking a medication with the same name, and this prescription was added. Make sure you understand how and when to take each.   Used for:  Post-op pain        Dose:  1-2 tablet   Take 1-2 tablets by mouth every 6 hours as needed for moderate to severe pain   Quantity:  45 tablet   Refills:  0       * Notice:  This list has 2 medication(s) that are the same as other medications prescribed for you. Read the directions carefully, and ask your doctor or other care provider to review them with you.      CONTINUE these medicines which have NOT CHANGED        Dose / Directions    amoxicillin-clavulanate 875-125 MG per tablet   Commonly known as:  AUGMENTIN        Refills:  0            Where to get your medicines      Some of these will need a paper prescription and others can be bought over the counter. Ask your nurse if you have questions.     Bring a paper prescription for each of these medications     HYDROcodone-acetaminophen  MG per tablet                Protect others around you: Learn how to safely use, store and throw away your medicines at www.disposemymeds.org.             Medication List: This is a list of all your medications and when to take them. Check marks below indicate your daily home schedule. Keep this list as a reference.      Medications           Morning Afternoon Evening Bedtime As Needed    amoxicillin-clavulanate 875-125 MG per tablet   Commonly known as:  AUGMENTIN                                * HYDROcodone-acetaminophen  MG per tablet   Commonly known as:  NORCO   TAKE 1 TABLET BY MOUTH EVERY 6 HOURS AS NEEDED FOR PAIN                                * HYDROcodone-acetaminophen  MG per tablet   Commonly known as:  NORCO   Take 1-2 tablets by mouth every 6 hours as needed for moderate to severe pain                                * Notice:  This list has 2  medication(s) that are the same as other medications prescribed for you. Read the directions carefully, and ask your doctor or other care provider to review them with you.

## 2017-04-21 NOTE — ANESTHESIA CARE TRANSFER NOTE
Patient: John Kinsey    Procedure(s):  Rectal Examination under Anesthesia and Fistulotomy X2 - Wound Class: II-Clean Contaminated    Diagnosis: anal fistula  Diagnosis Additional Information: No value filed.    Anesthesia Type:   General, ETT     Note:  Airway :Nasal Cannula  Patient transferred to:PACU        Vitals: (Last set prior to Anesthesia Care Transfer)    CRNA VITALS  4/21/2017 1017 - 4/21/2017 1052      4/21/2017             Resp Rate (observed): (!)  1                Electronically Signed By: BERNARDO Paulino CRNA  April 21, 2017  10:52 AM

## 2017-04-21 NOTE — IP AVS SNAPSHOT
[facility]    [address] [address]    Phone:  [phone]                                       After Visit Summary   4/21/2017    John Kinsey    MRN: [id number]           After Visit Summary Signature Page     I have received my discharge instructions, and my questions have been answered. I have discussed any challenges I see with this plan with the nurse or doctor.    ..........................................................................................................................................  Patient/Patient Representative Signature      ..........................................................................................................................................  Patient Representative Print Name and Relationship to Patient    ..................................................               ................................................  Date                                            Time    ..........................................................................................................................................  Reviewed by Signature/Title    ...................................................              ..............................................  Date                                                            Time

## 2017-04-21 NOTE — BRIEF OP NOTE
PreOp Dx: rehana-anal fistula    PostOp Dx: Per-anal fistula x 2    Procedure: Recta EUA and fistulotomy x 2    Surgeon: MAYRA Mcneil    Anesthesia: NHUNG Simmons CRNA    Findings: 2 fistulous tracts at the 11 and 1130 position    IVF: 600ml    UOP: n/a    EBL: 5ml      Haider Mcneil MD

## 2017-04-21 NOTE — DISCHARGE INSTRUCTIONS
Soak incision daily with soap and water. Some mild redness or swelling is expected. If draining, cover with dry gauze.    Anal canal gauze sponge will fall out with first BM or sooner.  No need to replace.    No lifting restrictions.    Okay to use ice pack over wound as necessary for comfort.    Use pain medication as necessary but avoid constipating side effects. Ibuprofen okay but avoid Tylenol as your pain medication already contains this.    Diet as tolerated. No restrictions.    Follow up with Dr Mcneil in 1-2 weeks.    Most people take the rest of the week off and return to work the following Monday. You may return sooner as pain allows. During your follow-up appointment, Dr. Mcneil will give you a formal letter for your work with any restrictions detailed.  All disability or other such paperwork will be addressed at that time.                                Same Day Surgery Discharge Instructions  Special Precautions After Surgery - Adult    1. It is not unusual to feel lightheaded or faint, up to 24 hours after surgery or while taking pain medication.  If you have these symptoms; sit for a few minutes before standing and have someone assist you when getting up.  2. You should rest and relax for the next 24 hours and must have someone stay with you for at least 24 hours after your discharge.  3. DO NOT DRIVE any vehicle or operate mechanical equipment for 24 hours following the end of your surgery.  DO NOT DRIVE while taking narcotic pain medications that have been prescribed by your physician.  If you had a limb operated on, you must be able to use it fully to drive.  4. DO NOT drink alcoholic beverages for 24 hours following surgery or while taking prescription pain medication.  5. Drink clear liquids (apple juice, ginger ale, broth, 7-Up, etc.).  Progress to your regular diet as you feel able.  6. Any questions call your physician and do not make important decisions for 24  hours.        __________________________________________________________________________________________________________________________________  IMPORTANT NUMBERS:    [facility] Main Number:  [phone], [phone]  Pharmacy:  [phone]  Same Day Surgery:  [phone], Monday - Friday until 8:30 p.m.  Urgent Care:  [phone]  Emergency Room:  [phone] [place] [address] Clinic:  [phone]                                                                             [facility]:  [phone] option [address] [place] [address] Orthopedics:  [phone]     OB Clinic:  [phone]   Surgery Specialty Clinic:  [phone]   Home Medical Equipment: [phone]  [facility]:  [phone]      - Patient took 2 tablets of Norco 5-325mg today at 11:40 AM.

## 2017-04-21 NOTE — ANESTHESIA POSTPROCEDURE EVALUATION
Patient: John Kinsey    Procedure(s):  Rectal Examination under Anesthesia and Fistulotomy X2 - Wound Class: II-Clean Contaminated    Diagnosis:anal fistula  Diagnosis Additional Information: No value filed.    Anesthesia Type:  General, ETT    Note:  Anesthesia Post Evaluation    Patient location during evaluation: Bedside  Patient participation: Able to fully participate in evaluation  Level of consciousness: awake and alert  Pain management: satisfactory to patient  Airway patency: patent  Cardiovascular status: acceptable and hemodynamically stable  Respiratory status: acceptable, room air and spontaneous ventilation  Hydration status: acceptable  PONV: none     Anesthetic complications: None          Last vitals:  Vitals:    04/21/17 1115 04/21/17 1130 04/21/17 1144   BP: 121/77 122/82    Pulse:      Resp: 16 16    Temp: 36.7  C (98.1  F) 36.4  C (97.6  F)    SpO2: 97% 96% 97%         Electronically Signed By: BERNARDO Paulino CRNA  April 21, 2017  12:28 PM

## 2017-04-21 NOTE — ANESTHESIA PREPROCEDURE EVALUATION
Anesthesia Evaluation     . Pt has had prior anesthetic. Type: General and MAC    History of anesthetic complications   - PONV        ROS/MED HX    ENT/Pulmonary:     (+)tobacco use, Past use , . .    Neurologic:  - neg neurologic ROS     Cardiovascular:     (+) Dyslipidemia, ----. : . . . :. . Previous cardiac testing date:results:date: results:ECG reviewed date:4-2017 results:Sinus Rhythm   WITHIN NORMAL LIMITS date: results:          METS/Exercise Tolerance:  >4 METS   Hematologic:  - neg hematologic  ROS       Musculoskeletal:  - neg musculoskeletal ROS       GI/Hepatic:     (+) Other GI/Hepatic anal fistula      Renal/Genitourinary:  - ROS Renal section negative       Endo:  - neg endo ROS       Psychiatric:  - neg psychiatric ROS       Infectious Disease:  - neg infectious disease ROS       Malignancy:      - no malignancy   Other:    - neg other ROS                 Physical Exam  Normal systems: cardiovascular, pulmonary and dental    Airway   Mallampati: I  TM distance: >3 FB  Neck ROM: full    Dental     Cardiovascular       Pulmonary                     Anesthesia Plan      History & Physical Review  History and physical reviewed and following examination; no interval change.    ASA Status:  2 .    NPO Status:  > 8 hours    Plan for General and ETT with Intravenous and Propofol induction. Maintenance will be TIVA.    PONV prophylaxis:  Ondansetron (or other 5HT-3) and Dexamethasone or Solumedrol  Additional equipment: Videolaryngoscope      Postoperative Care  Postoperative pain management:  IV analgesics and Oral pain medications.      Consents  Anesthetic plan, risks, benefits and alternatives discussed with:  Patient.  Use of blood products discussed: No .   .                          .

## 2017-04-22 NOTE — OP NOTE
DATE OF PROCEDURE:  04/21/2017      PREOPERATIVE DIAGNOSIS:  Perianal fistula.      POSTOPERATIVE DIAGNOSIS:  Perianal fistula x2.      PROCEDURE:  Rectal exam under anesthesia and fistulotomy x2.      SURGEON:  Haider Mcneil MD      ANESTHESIA:  General.      ANESTHESIOLOGIST:  Willa Simmons CRNA      FINDINGS:  Two perianal fistulas at approximately 11-11:30 while the patient is in the prone jackknife position, successfully unroofed.      INDICATIONS:  John Kinsey is a 56-year-old male with known history of septic hidradenitis presented with a history consistent with a fistula formation.  The patient would exhibit signs of increasing pain, redness and swelling followed by sudden drainage and healing and this would come and go over the course of several months.  On physical exam he a lot of scarring in and around his posterior anus as well as several small pits.  I could not find at that time a draining sinus tract.      CONSENT:  Risks, benefits, alternatives and complications were discussed with the patient, including the possibility of infection, bleeding, or the need for more surgery.  The patient understood and wished to proceed.      PROCEDURE:  The patient was taken to the operating room and placed in the supine position.  General endotracheal anesthesia was induced.  He was then placed into the prone jackknife position.  His buttocks were taped apart and the perianal area cleaned and draped in a sterile manner.  A surgical timeout was performed and 2 gm of Ancef were used as perioperative antibiotics.  A lubricated anal speculum was gently inserted into the anal canal.  The entire area was inspected initially no fistulous tracts or enlarged internal hemorrhoids could be located.  Finally, I used a retrograde probe to gently probe the dentate line and at 2 places at approximately 11 o'clock and 11:30 I found a fistulous tract leading to the subcutaneous tissue at the immediately adjacent perianal area.   I could not find the external opening, but the tracts were clearly there, I opened each track up using cautery and then used a curet to clean out any granulation tissue.  The tracts were left open.  The entire area was injected with of 20 mL of Exparel solution.  A lubricated gauze sponge was placed into the anal canal.  The patient was placed into mesh underwear flipped over back onto his back, extubated and transferred to the PACU in stable condition.      PLAN:  Following a stable postoperative course, he will be discharged home with a high fiber diet and activity as tolerated.      DISABILITY:  None.      MEDICATIONS:  Prescription written for Norco.      INSTRUCTIONS:  The patient advised to soak the wound daily in soap and water and to follow up with me in 1-2 weeks.      ESTIMATED BLOOD LOSS:  5 mL.      INTRAVENOUS FLUIDS:  About 600.         QUEENIE BRONSON             D: 2017 10:39   T: 2017 23:41   MT: [id number]      Name:     SHAGGY HAYDEN   MRN:      [id number]        Account:        [id number]   :      1960           Procedure Date: 2017      Document: [id number]

## 2017-04-24 ENCOUNTER — TELEPHONE (OUTPATIENT)
Dept: FAMILY MEDICINE | Facility: CLINIC | Age: 57
End: 2017-04-24

## 2017-04-24 NOTE — TELEPHONE ENCOUNTER
Reason for Call:  Other prescription    Detailed comments: pt is calling because he has questions as to why is medication was changed   And what was found in the culture.     Phone Number Patient can be reached at: Home number on file [phone] (home)    Best Time: any     Can we leave a detailed message on this number? YES    Call taken on 4/24/2017 at 3:22 PM by Destiney Ken

## 2017-04-25 NOTE — TELEPHONE ENCOUNTER
Pt reports that he was instructed by Dr Mcneil to NOT start the antibiotics until he developed symptoms of infection, so he has not started taking any antibiotics.     He denies any fevers, no pain, no redness of the nava and only pink/watery drainage.     Please advise. Thank you. Mary Madison RN    Wound culture results notes 4/19/17.  Notes Recorded by Ana Gaytan APRN CNP on 4/24/2017 at 11:58 AM  Based on culture reports- will change antibiotics- stop Augmentin and I will send in antibiotics of Bactrim.

## 2017-10-11 ENCOUNTER — TELEPHONE (OUTPATIENT)
Dept: SURGERY | Facility: CLINIC | Age: 57
End: 2017-10-11

## 2017-10-11 NOTE — TELEPHONE ENCOUNTER
Reason for Call:  Other call back    Detailed comments: pt calling stating he has Cyst that has come back and would like to know if he can get in to it have removed in OR this week. He is seeing his PCP tomorrow.     Phone Number Patient can be reached at: Home number on file [phone] (home)    Best Time: any     Can we leave a detailed message on this number? YES    Call taken on 10/11/2017 at 8:53 AM by Riana Mcguire

## 2017-10-11 NOTE — TELEPHONE ENCOUNTER
Talked with John and he will come down to specialty clinic after his appointment with Ana Gaytan. Dr. Mcneil can do his surgery on Friday Oct. 13th @ 10:30.  Claudia Chino MA

## 2017-10-12 ENCOUNTER — OFFICE VISIT (OUTPATIENT)
Dept: FAMILY MEDICINE | Facility: CLINIC | Age: 57
End: 2017-10-12
Payer: COMMERCIAL

## 2017-10-12 ENCOUNTER — ANESTHESIA EVENT (OUTPATIENT)
Dept: SURGERY | Facility: CLINIC | Age: 57
End: 2017-10-12
Payer: COMMERCIAL

## 2017-10-12 VITALS
BODY MASS INDEX: 26.88 KG/M2 | HEART RATE: 76 BPM | HEIGHT: 73 IN | WEIGHT: 202.8 LBS | SYSTOLIC BLOOD PRESSURE: 109 MMHG | DIASTOLIC BLOOD PRESSURE: 71 MMHG | TEMPERATURE: 96.7 F

## 2017-10-12 DIAGNOSIS — Z01.818 PREOP GENERAL PHYSICAL EXAM: Primary | ICD-10-CM

## 2017-10-12 DIAGNOSIS — K60.30 PERIANAL FISTULA: ICD-10-CM

## 2017-10-12 PROCEDURE — 99214 OFFICE O/P EST MOD 30 MIN: CPT | Performed by: NURSE PRACTITIONER

## 2017-10-12 NOTE — PATIENT INSTRUCTIONS
Thank you for choosing [facility].  You may be receiving a survey in the mail from Obinna Torres regarding your visit today.  Please take a few minutes to complete and return the survey to let us know how we are doing.      If you have questions or concerns, please contact us via [employer] or you can contact your care team at [phone].    Our Clinic hours are:  Monday 6:40 am  to 7:00 pm  Tuesday -Friday 6:40 am to 5:00 pm    The [facility] outpatient lab hours are:  Monday - Friday 6:10 am to 4:45 pm  Saturdays 7:00 am to 11:00 am  Appointments are required, call [phone]    If you have clinical questions after hours or would like to schedule an appointment,  call the clinic at [phone].    Before Your Surgery      Call your surgeon if there is any change in your health. This includes signs of a cold or flu (such as a sore throat, runny nose, cough, rash or fever).    Do not smoke, drink alcohol or take over the counter medicine (unless your surgeon or primary care doctor tells you to) for the 24 hours before and after surgery.    If you take prescribed drugs: Follow your doctor s orders about which medicines to take and which to stop until after surgery.    Eating and drinking prior to surgery: follow the instructions from your surgeon    Take a shower or bath the night before surgery. Use the soap your surgeon gave you to gently clean your skin. If you do not have soap from your surgeon, use your regular soap. Do not shave or scrub the surgery site.  Wear clean pajamas and have clean sheets on your bed.

## 2017-10-12 NOTE — PROGRESS NOTES
[facility]  [address] [address]  [phone]  Dept: [phone]    PRE-OP EVALUATION:  Today's date: 10/12/2017    John Kinsey (: 1960) presents for pre-operative evaluation assessment as requested by Dr. Mcneil.  He requires evaluation and anesthesia risk assessment prior to undergoing surgery/procedure for treatment of   .  Proposed procedure: fistulotomy rectum    Date of Surgery/ Procedure: 10/13/17  Time of Surgery/ Procedure: 10:30  Hospital/Surgical Facility: [facility]    Primary Physician: Indira Maya  Type of Anesthesia Anticipated: General    Patient has a Health Care Directive or Living Will:  NO    1. NO - Do you have a history of heart attack, stroke, stent, bypass or surgery on an artery in the head, neck, heart or legs?  2. NO - Do you ever have any pain or discomfort in your chest?  3. NO - Do you have a history of  Heart Failure?  4. NO - Are you troubled by shortness of breath when: walking on the level, up a slight hill or at night?  5. NO - Do you currently have a cold, bronchitis or other respiratory infection?  6. NO - Do you have a cough, shortness of breath or wheezing?  7. NO - Do you sometimes get pains in the calves of your legs when you walk?  8. NO - Do you or anyone in your family have previous history of blood clots?  9. NO - Do you or does anyone in your family have a serious bleeding problem such as prolonged bleeding following surgeries or cuts?  10. NO - Have you ever had problems with anemia or been told to take iron pills?  11. NO - Have you had any abnormal blood loss such as black, tarry or bloody stools, or abnormal vaginal bleeding?  12. NO - Have you ever had a blood transfusion?  13. NO - Have you or any of your relatives ever had problems with anesthesia?  14. NO - Do you have sleep apnea, excessive snoring or daytime drowsiness?  15. NO - Do you have any prosthetic heart valves?  16. NO - Do  you have prosthetic joints?  17. NO - Is there any chance that you may be pregnant?        HPI:                                                      Brief HPI related to upcoming procedure: patient with history of perianal fistula- patient had fistulotomy 1 year ago.         MEDICAL HISTORY:                                                    Patient Active Problem List    Diagnosis Date Noted     Anal fissure 03/19/2013     Priority: Medium      Past Medical History:   Diagnosis Date     Anal fistula 3/20/13     Motion sickness      PONV (postoperative nausea and vomiting)      Past Surgical History:   Procedure Laterality Date     COLONOSCOPY  4/11/2013    Procedure: COLONOSCOPY;  Diagnostic Colonoscopy,Anal Exam, Anal Fistulotomy ;  Surgeon: Haider Deleon MD;  Location: [facility] OR     ENT SURGERY      T&A     EXAM UNDER ANESTHESIA, FISTULOTOMY RECTUM, COMBINED N/A 4/21/2017    Procedure: COMBINED EXAM UNDER ANESTHESIA, FISTULOTOMY RECTUM;  Rectal Examination under Anesthesia and Fistulotomy X2;  Surgeon: Haider Mcneil MD;  Location: [facility]     EXCISE HIDRADENITIS (LOCATION)  10/10/2013    Procedure: EXCISE HIDRADENITIS (LOCATION);  Excision of bilateral axillary scarring;  Surgeon: Haider Mcneil MD;  Location: [facility]     FISTULOTOMY RECTUM  4/11/2013    Procedure: FISTULOTOMY RECTUM;;  Surgeon: Haider Deloen MD;  Location: [facility] OR     HYDROCELECTOMY SCROTAL N/A 10/13/2015    Procedure: HYDROCELECTOMY SCROTAL;  Surgeon: VINCENT Huber MD;  Location: [facility]     ORTHOPEDIC SURGERY  3/24/12    Left shoulder surgery     No current outpatient prescriptions on file.     OTC products: None, except as noted above    Allergies   Allergen Reactions     Nkda [No Known Drug Allergies]       Latex Allergy: NO    Social History   Substance Use Topics     Smoking status: Former Smoker     Types: Cigarettes     Smokeless tobacco: Never Used     Alcohol use Yes      Comment: 2 beers weekly     History   Drug Use No  "      REVIEW OF SYSTEMS:                                                    C: NEGATIVE for fever, chills, change in weight  I: NEGATIVE for worrisome rashes, moles or lesions  E: NEGATIVE for vision changes or irritation  E/M: NEGATIVE for ear, mouth and throat problems  R: NEGATIVE for significant cough or SOB  B: NEGATIVE for masses, tenderness or discharge  CV: NEGATIVE for chest pain, palpitations or peripheral edema  GI: NEGATIVE for nausea, abdominal pain, heartburn, or change in bowel habits   male :negative for dysuria, hematuria, decreased urinary stream, erectile dysfunction and positive for  Perianal fistula   M: NEGATIVE for significant arthralgias or myalgia  N: NEGATIVE for weakness, dizziness or paresthesias  E: NEGATIVE for temperature intolerance, skin/hair changes  H: NEGATIVE for bleeding problems  P: NEGATIVE for changes in mood or affect    EXAM:                                                    /71 (BP Location: Left arm, Patient Position: Chair, Cuff Size: Adult Large)  Pulse 76  Temp 96.7  F (35.9  C) (Tympanic)  Ht 6' 1\" (1.854 m)  Wt 202 lb 12.8 oz (92 kg)  BMI 26.76 kg/m2    GENERAL APPEARANCE: healthy, alert and no distress     EYES: EOMI,  PERRL     HENT: ear canals and TM's normal and nose and mouth without ulcers or lesions     NECK: no adenopathy, no asymmetry, masses, or scars and thyroid normal to palpation     RESP: lungs clear to auscultation - no rales, rhonchi or wheezes     CV: regular rates and rhythm, normal S1 S2, no S3 or S4 and no murmur, click or rub     ABDOMEN:  soft, nontender, no HSM or masses and bowel sounds normal     Rectal exam: perianal abscess      MS: extremities normal- no gross deformities noted, no evidence of inflammation in joints, FROM in all extremities.     SKIN: no suspicious lesions or rashes     NEURO: Normal strength and tone, sensory exam grossly normal, mentation intact and speech normal     PSYCH: mentation appears normal. and " affect normal/bright     LYMPHATICS: No axillary, cervical, or supraclavicular nodes    DIAGNOSTICS:                                                    EK2017: appears normal, NSR, normal axis, normal intervals, no acute ST/T changes c/w ischemia, unchanged from previous tracings    Recent Labs   Lab Test  17   0836  10/02/13   1039   HGB   --   18.8*   PLT   --   450   NA  140   --    POTASSIUM  3.9   --    CR  0.99   --         IMPRESSION:                                                    Reason for surgery/procedure: fistulotomy rectum  Diagnosis/reason for consult: Pre operative exam    The proposed surgical procedure is considered LOW risk.    REVISED CARDIAC RISK INDEX  The patient has the following serious cardiovascular risks for perioperative complications such as (MI, PE, VFib and 3  AV Block):  No serious cardiac risks  INTERPRETATION: 0 risks: Class I (very low risk - 0.4% complication rate)    The patient has the following additional risks for perioperative complications:  No identified additional risks      ICD-10-CM    1. Preop general physical exam Z01.818    2. Perianal fistula K60.3        RECOMMENDATIONS:                                                      APPROVAL GIVEN to proceed with proposed procedure, without further diagnostic evaluation       Signed Electronically by: BERNARDO Trammell CNP    Copy of this evaluation report is provided to requesting physician.    [facility] Preop Guidelines

## 2017-10-12 NOTE — MR AVS SNAPSHOT
After Visit Summary   10/12/2017    John Kinsey    MRN: [id number]           Patient Information     Date Of Birth          1960        Visit Information        Provider Department      10/12/2017 8:40 AM Ana Gaytan APRN CNP [facility]        Today's Diagnoses     Preop general physical exam    -  1      Care Instructions          Thank you for choosing [facility].  You may be receiving a survey in the mail from [email or web address] regarding your visit today.  Please take a few minutes to complete and return the survey to let us know how we are doing.      If you have questions or concerns, please contact us via [employer] or you can contact your care team at [phone].    Our Clinic hours are:  Monday 6:40 am  to 7:00 pm  Tuesday -Friday 6:40 am to 5:00 pm    The [place] outpatient lab hours are:  Monday - Friday 6:10 am to 4:45 pm  Saturdays 7:00 am to 11:00 am  Appointments are required, call [phone]    If you have clinical questions after hours or would like to schedule an appointment,  call the clinic at [phone].    Before Your Surgery      Call your surgeon if there is any change in your health. This includes signs of a cold or flu (such as a sore throat, runny nose, cough, rash or fever).    Do not smoke, drink alcohol or take over the counter medicine (unless your surgeon or primary care doctor tells you to) for the 24 hours before and after surgery.    If you take prescribed drugs: Follow your doctor s orders about which medicines to take and which to stop until after surgery.    Eating and drinking prior to surgery: follow the instructions from your surgeon    Take a shower or bath the night before surgery. Use the soap your surgeon gave you to gently clean your skin. If you do not have soap from your surgeon, use your regular soap. Do not shave or scrub the surgery site.  Wear clean pajamas and have clean sheets on your bed.           Follow-ups after  "your visit        Your next 10 appointments already scheduled     Oct 13, 2017   Procedure with Haider Mcneil MD   [facility] (--)    [address] [address] [phone]           The medical center is located at [address] (between [address] and [address] in [place], [place] miles north of [place]).              Who to contact     If you have questions or need follow up information about today's clinic visit or your schedule please contact [facility] directly at [phone].  Normal or non-critical lab and imaging results will be communicated to you by [employer], letter or phone within 4 business days after the clinic has received the results. If you do not hear from us within 7 days, please contact the clinic through [employer] or phone. If you have a critical or abnormal lab result, we will notify you by phone as soon as possible.  Submit refill requests through [employer] or call your pharmacy and they will forward the refill request to us. Please allow 3 business days for your refill to be completed.          Additional Information About Your Visit        MyChart Information     [employer] lets you send messages to your doctor, view your test results, renew your prescriptions, schedule appointments and more. To sign up, go to [email or web address] . Click on \"Log in\" on the left side of the screen, which will take you to the Welcome page. Then click on \"Sign up Now\" on the right side of the page.     You will be asked to enter the access code listed below, as well as some personal information. Please follow the directions to create your username and password.     Your access code is: [email or web address]  Expires: 1/10/2018  8:57 AM     Your access code will  in 90 days. If you need help or a new code, please call your [facility] or [phone].        Care EveryWhere ID     This is your Care EveryWhere ID. This could be used by other organizations to " "access your [place] medical records  [id number]        Your Vitals Were     Pulse Temperature Height BMI (Body Mass Index)          76 96.7  F (35.9  C) (Tympanic) 6' 1\" (1.854 m) 26.76 kg/m2         Blood Pressure from Last 3 Encounters:   10/12/17 109/71   04/21/17 118/60   04/19/17 122/80    Weight from Last 3 Encounters:   10/12/17 202 lb 12.8 oz (92 kg)   04/21/17 210 lb (95.3 kg)   04/19/17 212 lb (96.2 kg)              Today, you had the following     No orders found for display       Primary Care Provider Office Phone # Fax #    Indira Prince Frnacesco [phone]       [place] [address] [address]        Equal Access to Services     JUDSON DIAS : Hadii fátima navarro Somay, waaxda luqadaha, qaybta kaalmada adelamin, loy daniels . So [facility] [phone].    ATENCIÓN: Si habla español, tiene a mccormack disposición servicios gratuitos de asistencia lingüística. Deb al [phone].    We comply with applicable federal civil rights laws and [place] laws. We do not discriminate on the basis of race, color, national origin, age, disability, sex, sexual orientation, or gender identity.            Thank you!     Thank you for choosing [facility]  for your care. Our goal is always to provide you with excellent care. Hearing back from our patients is one way we can continue to improve our services. Please take a few minutes to complete the written survey that you may receive in the mail after your visit with us. Thank you!             Your Updated Medication List - Protect others around you: Learn how to safely use, store and throw away your medicines at www.disposemymeds.org.      Notice  As of 10/12/2017  8:57 AM    You have not been prescribed any medications.      "

## 2017-10-12 NOTE — NURSING NOTE
"Chief Complaint   Patient presents with     Pre-Op Exam       Initial /71 (BP Location: Left arm, Patient Position: Chair, Cuff Size: Adult Large)  Pulse 76  Temp 96.7  F (35.9  C) (Tympanic)  Ht 6' 1\" (1.854 m)  Wt 202 lb 12.8 oz (92 kg)  BMI 26.76 kg/m2 Estimated body mass index is 26.76 kg/(m^2) as calculated from the following:    Height as of this encounter: 6' 1\" (1.854 m).    Weight as of this encounter: 202 lb 12.8 oz (92 kg).  Medication Reconciliation: complete  "

## 2017-10-13 ENCOUNTER — HOSPITAL ENCOUNTER (OUTPATIENT)
Facility: CLINIC | Age: 57
Discharge: HOME | End: 2017-10-13
Attending: SURGERY | Admitting: SURGERY
Payer: COMMERCIAL

## 2017-10-13 ENCOUNTER — ANESTHESIA (OUTPATIENT)
Dept: SURGERY | Facility: CLINIC | Age: 57
End: 2017-10-13
Payer: COMMERCIAL

## 2017-10-13 VITALS
WEIGHT: 202 LBS | SYSTOLIC BLOOD PRESSURE: 130 MMHG | RESPIRATION RATE: 16 BRPM | TEMPERATURE: 98.2 F | BODY MASS INDEX: 26.77 KG/M2 | OXYGEN SATURATION: 95 % | HEIGHT: 73 IN | HEART RATE: 78 BPM | DIASTOLIC BLOOD PRESSURE: 99 MMHG

## 2017-10-13 DIAGNOSIS — G89.18 POST-OP PAIN: Primary | ICD-10-CM

## 2017-10-13 PROCEDURE — C9290 INJ, BUPIVACAINE LIPOSOME: HCPCS | Performed by: SURGERY

## 2017-10-13 PROCEDURE — 36000050 ZZH SURGERY LEVEL 2 1ST 30 MIN: Performed by: SURGERY

## 2017-10-13 PROCEDURE — 88304 TISSUE EXAM BY PATHOLOGIST: CPT | Performed by: SURGERY

## 2017-10-13 PROCEDURE — 25000128 H RX IP 250 OP 636: Performed by: NURSE ANESTHETIST, CERTIFIED REGISTERED

## 2017-10-13 PROCEDURE — 46999 UNLISTED PROCEDURE ANUS: CPT | Performed by: SURGERY

## 2017-10-13 PROCEDURE — 25000128 H RX IP 250 OP 636: Performed by: SURGERY

## 2017-10-13 PROCEDURE — 27210794 ZZH OR GENERAL SUPPLY STERILE: Performed by: SURGERY

## 2017-10-13 PROCEDURE — 88304 TISSUE EXAM BY PATHOLOGIST: CPT | Mod: 26 | Performed by: SURGERY

## 2017-10-13 PROCEDURE — 25000125 ZZHC RX 250: Performed by: NURSE ANESTHETIST, CERTIFIED REGISTERED

## 2017-10-13 PROCEDURE — 71000027 ZZH RECOVERY PHASE 2 EACH 15 MINS: Performed by: SURGERY

## 2017-10-13 PROCEDURE — 36000052 ZZH SURGERY LEVEL 2 EA 15 ADDTL MIN: Performed by: SURGERY

## 2017-10-13 PROCEDURE — 37000008 ZZH ANESTHESIA TECHNICAL FEE, 1ST 30 MIN: Performed by: SURGERY

## 2017-10-13 PROCEDURE — 25000132 ZZH RX MED GY IP 250 OP 250 PS 637: Performed by: SURGERY

## 2017-10-13 PROCEDURE — 40000305 ZZH STATISTIC PRE PROC ASSESS I: Performed by: SURGERY

## 2017-10-13 PROCEDURE — 37000009 ZZH ANESTHESIA TECHNICAL FEE, EACH ADDTL 15 MIN: Performed by: SURGERY

## 2017-10-13 RX ORDER — SILDENAFIL 50 MG/1
50 TABLET, FILM COATED ORAL
COMMUNITY
Start: 2017-08-03 | End: 2018-03-27

## 2017-10-13 RX ORDER — FENTANYL CITRATE 50 UG/ML
25-50 INJECTION, SOLUTION INTRAMUSCULAR; INTRAVENOUS
Status: DISCONTINUED | OUTPATIENT
Start: 2017-10-13 | End: 2017-10-13 | Stop reason: HOSPADM

## 2017-10-13 RX ORDER — OXYCODONE AND ACETAMINOPHEN 5; 325 MG/1; MG/1
1-2 TABLET ORAL EVERY 6 HOURS PRN
Qty: 60 TABLET | Refills: 0 | Status: SHIPPED | OUTPATIENT
Start: 2017-10-13 | End: 2018-03-27

## 2017-10-13 RX ORDER — PROPOFOL 10 MG/ML
INJECTION, EMULSION INTRAVENOUS CONTINUOUS PRN
Status: DISCONTINUED | OUTPATIENT
Start: 2017-10-13 | End: 2017-10-13

## 2017-10-13 RX ORDER — ONDANSETRON 2 MG/ML
INJECTION INTRAMUSCULAR; INTRAVENOUS PRN
Status: DISCONTINUED | OUTPATIENT
Start: 2017-10-13 | End: 2017-10-13

## 2017-10-13 RX ORDER — NALOXONE HYDROCHLORIDE 0.4 MG/ML
.1-.4 INJECTION, SOLUTION INTRAMUSCULAR; INTRAVENOUS; SUBCUTANEOUS
Status: DISCONTINUED | OUTPATIENT
Start: 2017-10-13 | End: 2017-10-13 | Stop reason: HOSPADM

## 2017-10-13 RX ORDER — DEXAMETHASONE SODIUM PHOSPHATE 4 MG/ML
INJECTION, SOLUTION INTRA-ARTICULAR; INTRALESIONAL; INTRAMUSCULAR; INTRAVENOUS; SOFT TISSUE PRN
Status: DISCONTINUED | OUTPATIENT
Start: 2017-10-13 | End: 2017-10-13

## 2017-10-13 RX ORDER — ONDANSETRON 2 MG/ML
4 INJECTION INTRAMUSCULAR; INTRAVENOUS EVERY 30 MIN PRN
Status: DISCONTINUED | OUTPATIENT
Start: 2017-10-13 | End: 2017-10-13 | Stop reason: HOSPADM

## 2017-10-13 RX ORDER — ONDANSETRON 4 MG/1
4 TABLET, ORALLY DISINTEGRATING ORAL EVERY 30 MIN PRN
Status: DISCONTINUED | OUTPATIENT
Start: 2017-10-13 | End: 2017-10-13 | Stop reason: HOSPADM

## 2017-10-13 RX ORDER — SODIUM CHLORIDE, SODIUM LACTATE, POTASSIUM CHLORIDE, CALCIUM CHLORIDE 600; 310; 30; 20 MG/100ML; MG/100ML; MG/100ML; MG/100ML
INJECTION, SOLUTION INTRAVENOUS CONTINUOUS
Status: DISCONTINUED | OUTPATIENT
Start: 2017-10-13 | End: 2017-10-13 | Stop reason: HOSPADM

## 2017-10-13 RX ORDER — KETOROLAC TROMETHAMINE 30 MG/ML
30 INJECTION, SOLUTION INTRAMUSCULAR; INTRAVENOUS EVERY 6 HOURS PRN
Status: DISCONTINUED | OUTPATIENT
Start: 2017-10-13 | End: 2017-10-13 | Stop reason: HOSPADM

## 2017-10-13 RX ORDER — HYDROMORPHONE HYDROCHLORIDE 1 MG/ML
.3-.5 INJECTION, SOLUTION INTRAMUSCULAR; INTRAVENOUS; SUBCUTANEOUS EVERY 10 MIN PRN
Status: DISCONTINUED | OUTPATIENT
Start: 2017-10-13 | End: 2017-10-13 | Stop reason: HOSPADM

## 2017-10-13 RX ORDER — OXYCODONE AND ACETAMINOPHEN 5; 325 MG/1; MG/1
1-2 TABLET ORAL EVERY 4 HOURS PRN
Status: DISCONTINUED | OUTPATIENT
Start: 2017-10-13 | End: 2017-10-13 | Stop reason: HOSPADM

## 2017-10-13 RX ORDER — MEPERIDINE HYDROCHLORIDE 25 MG/ML
12.5 INJECTION INTRAMUSCULAR; INTRAVENOUS; SUBCUTANEOUS
Status: DISCONTINUED | OUTPATIENT
Start: 2017-10-13 | End: 2017-10-13 | Stop reason: HOSPADM

## 2017-10-13 RX ORDER — LIDOCAINE 40 MG/G
CREAM TOPICAL
Status: DISCONTINUED | OUTPATIENT
Start: 2017-10-13 | End: 2017-10-13 | Stop reason: HOSPADM

## 2017-10-13 RX ORDER — CEFAZOLIN SODIUM 1 G/3ML
1 INJECTION, POWDER, FOR SOLUTION INTRAMUSCULAR; INTRAVENOUS SEE ADMIN INSTRUCTIONS
Status: DISCONTINUED | OUTPATIENT
Start: 2017-10-13 | End: 2017-10-13 | Stop reason: HOSPADM

## 2017-10-13 RX ORDER — KETAMINE HYDROCHLORIDE 50 MG/ML
INJECTION, SOLUTION INTRAMUSCULAR; INTRAVENOUS PRN
Status: DISCONTINUED | OUTPATIENT
Start: 2017-10-13 | End: 2017-10-13

## 2017-10-13 RX ORDER — FENTANYL CITRATE 50 UG/ML
INJECTION, SOLUTION INTRAMUSCULAR; INTRAVENOUS PRN
Status: DISCONTINUED | OUTPATIENT
Start: 2017-10-13 | End: 2017-10-13

## 2017-10-13 RX ORDER — CEFAZOLIN SODIUM 2 G/100ML
2 INJECTION, SOLUTION INTRAVENOUS
Status: COMPLETED | OUTPATIENT
Start: 2017-10-13 | End: 2017-10-13

## 2017-10-13 RX ADMIN — LIDOCAINE HYDROCHLORIDE 1 ML: 10 INJECTION, SOLUTION EPIDURAL; INFILTRATION; INTRACAUDAL; PERINEURAL at 09:57

## 2017-10-13 RX ADMIN — KETAMINE HYDROCHLORIDE 10 MG: 50 INJECTION, SOLUTION INTRAMUSCULAR; INTRAVENOUS at 10:23

## 2017-10-13 RX ADMIN — ONDANSETRON 4 MG: 2 INJECTION INTRAMUSCULAR; INTRAVENOUS at 10:10

## 2017-10-13 RX ADMIN — FENTANYL CITRATE 100 MCG: 50 INJECTION, SOLUTION INTRAMUSCULAR; INTRAVENOUS at 10:14

## 2017-10-13 RX ADMIN — SODIUM CHLORIDE, POTASSIUM CHLORIDE, SODIUM LACTATE AND CALCIUM CHLORIDE: 600; 310; 30; 20 INJECTION, SOLUTION INTRAVENOUS at 09:57

## 2017-10-13 RX ADMIN — PROPOFOL 200 MCG/KG/MIN: 10 INJECTION, EMULSION INTRAVENOUS at 10:14

## 2017-10-13 RX ADMIN — CEFAZOLIN SODIUM 2 G: 2 INJECTION, SOLUTION INTRAVENOUS at 10:08

## 2017-10-13 RX ADMIN — HYDROMORPHONE HYDROCHLORIDE 0.5 MG: 1 INJECTION, SOLUTION INTRAMUSCULAR; INTRAVENOUS; SUBCUTANEOUS at 11:17

## 2017-10-13 RX ADMIN — OXYCODONE HYDROCHLORIDE AND ACETAMINOPHEN 2 TABLET: 5; 325 TABLET ORAL at 11:17

## 2017-10-13 RX ADMIN — DEXAMETHASONE SODIUM PHOSPHATE 4 MG: 4 INJECTION, SOLUTION INTRA-ARTICULAR; INTRALESIONAL; INTRAMUSCULAR; INTRAVENOUS; SOFT TISSUE at 10:10

## 2017-10-13 RX ADMIN — KETAMINE HYDROCHLORIDE 10 MG: 50 INJECTION, SOLUTION INTRAMUSCULAR; INTRAVENOUS at 10:26

## 2017-10-13 ASSESSMENT — LIFESTYLE VARIABLES: TOBACCO_USE: 1

## 2017-10-13 NOTE — BRIEF OP NOTE
PreOp Dx: rectal pain and swelling    PostOp Dx: Enlarged external hemorrhoid, rectal bleeding    Procedure: Rectal EUA and hemorrhoidectomy    Surgeon: MAYRA Mcneil    [facility]    Findings: old blood in rectum.  Enlarged external hemorrhoid    IVF: 373ml    UOP: n/a    EBL: 5ml      Haider Mcneil MD

## 2017-10-13 NOTE — H&P (VIEW-ONLY)
[facility]  [address] [address]  [phone]  Dept: [phone]    PRE-OP EVALUATION:  Today's date: 10/12/2017    John Kinsey (: 1960) presents for pre-operative evaluation assessment as requested by Dr. Mcneil.  He requires evaluation and anesthesia risk assessment prior to undergoing surgery/procedure for treatment of   .  Proposed procedure: fistulotomy rectum    Date of Surgery/ Procedure: 10/13/17  Time of Surgery/ Procedure: 10:30  Hospital/Surgical Facility: [facility]    Primary Physician: Indira Maya  Type of Anesthesia Anticipated: General    Patient has a Health Care Directive or Living Will:  NO    1. NO - Do you have a history of heart attack, stroke, stent, bypass or surgery on an artery in the head, neck, heart or legs?  2. NO - Do you ever have any pain or discomfort in your chest?  3. NO - Do you have a history of  Heart Failure?  4. NO - Are you troubled by shortness of breath when: walking on the level, up a slight hill or at night?  5. NO - Do you currently have a cold, bronchitis or other respiratory infection?  6. NO - Do you have a cough, shortness of breath or wheezing?  7. NO - Do you sometimes get pains in the calves of your legs when you walk?  8. NO - Do you or anyone in your family have previous history of blood clots?  9. NO - Do you or does anyone in your family have a serious bleeding problem such as prolonged bleeding following surgeries or cuts?  10. NO - Have you ever had problems with anemia or been told to take iron pills?  11. NO - Have you had any abnormal blood loss such as black, tarry or bloody stools, or abnormal vaginal bleeding?  12. NO - Have you ever had a blood transfusion?  13. NO - Have you or any of your relatives ever had problems with anesthesia?  14. NO - Do you have sleep apnea, excessive snoring or daytime drowsiness?  15. NO - Do you have any prosthetic heart valves?  16. NO - Do  you have prosthetic joints?  17. NO - Is there any chance that you may be pregnant?        HPI:                                                      Brief HPI related to upcoming procedure: patient with history of perianal fistula- patient had fistulotomy 1 year ago.         MEDICAL HISTORY:                                                    Patient Active Problem List    Diagnosis Date Noted     Anal fissure 03/19/2013     Priority: Medium      Past Medical History:   Diagnosis Date     Anal fistula 3/20/13     Motion sickness      PONV (postoperative nausea and vomiting)      Past Surgical History:   Procedure Laterality Date     COLONOSCOPY  4/11/2013    Procedure: COLONOSCOPY;  Diagnostic Colonoscopy,Anal Exam, Anal Fistulotomy ;  Surgeon: Haider Deleon MD;  Location: [facility]     ENT SURGERY      T&A     EXAM UNDER ANESTHESIA, FISTULOTOMY RECTUM, COMBINED N/A 4/21/2017    Procedure: COMBINED EXAM UNDER ANESTHESIA, FISTULOTOMY RECTUM;  Rectal Examination under Anesthesia and Fistulotomy X2;  Surgeon: Haider Mcneil MD;  Location: [facility]     EXCISE HIDRADENITIS (LOCATION)  10/10/2013    Procedure: EXCISE HIDRADENITIS (LOCATION);  Excision of bilateral axillary scarring;  Surgeon: Haider Mcneil MD;  Location: [facility]     FISTULOTOMY RECTUM  4/11/2013    Procedure: FISTULOTOMY RECTUM;;  Surgeon: Haider Deleon MD;  [facility]: [facility]     HYDROCELECTOMY SCROTAL N/A 10/13/2015    Procedure: HYDROCELECTOMY SCROTAL;  Surgeon: VINCENT Huber MD;  Location: [facility]     ORTHOPEDIC SURGERY  3/24/12    Left shoulder surgery     No current outpatient prescriptions on file.     OTC products: None, except as noted above    Allergies   Allergen Reactions     Nkda [No Known Drug Allergies]       Latex Allergy: NO    Social History   Substance Use Topics     Smoking status: Former Smoker     Types: Cigarettes     Smokeless tobacco: Never Used     Alcohol use Yes      Comment: 2 beers weekly     History   Drug Use No  "      REVIEW OF SYSTEMS:                                                    C: NEGATIVE for fever, chills, change in weight  I: NEGATIVE for worrisome rashes, moles or lesions  E: NEGATIVE for vision changes or irritation  E/M: NEGATIVE for ear, mouth and throat problems  R: NEGATIVE for significant cough or SOB  B: NEGATIVE for masses, tenderness or discharge  CV: NEGATIVE for chest pain, palpitations or peripheral edema  GI: NEGATIVE for nausea, abdominal pain, heartburn, or change in bowel habits   male :negative for dysuria, hematuria, decreased urinary stream, erectile dysfunction and positive for  Perianal fistula   M: NEGATIVE for significant arthralgias or myalgia  N: NEGATIVE for weakness, dizziness or paresthesias  E: NEGATIVE for temperature intolerance, skin/hair changes  H: NEGATIVE for bleeding problems  P: NEGATIVE for changes in mood or affect    EXAM:                                                    /71 (BP Location: Left arm, Patient Position: Chair, Cuff Size: Adult Large)  Pulse 76  Temp 96.7  F (35.9  C) (Tympanic)  Ht 6' 1\" (1.854 m)  Wt 202 lb 12.8 oz (92 kg)  BMI 26.76 kg/m2    GENERAL APPEARANCE: healthy, alert and no distress     EYES: EOMI,  PERRL     HENT: ear canals and TM's normal and nose and mouth without ulcers or lesions     NECK: no adenopathy, no asymmetry, masses, or scars and thyroid normal to palpation     RESP: lungs clear to auscultation - no rales, rhonchi or wheezes     CV: regular rates and rhythm, normal S1 S2, no S3 or S4 and no murmur, click or rub     ABDOMEN:  soft, nontender, no HSM or masses and bowel sounds normal     Rectal exam: perianal abscess      MS: extremities normal- no gross deformities noted, no evidence of inflammation in joints, FROM in all extremities.     SKIN: no suspicious lesions or rashes     NEURO: Normal strength and tone, sensory exam grossly normal, mentation intact and speech normal     PSYCH: mentation appears normal. and " affect normal/bright     LYMPHATICS: No axillary, cervical, or supraclavicular nodes    DIAGNOSTICS:                                                    EK2017: appears normal, NSR, normal axis, normal intervals, no acute ST/T changes c/w ischemia, unchanged from previous tracings    Recent Labs   Lab Test  17   0836  10/02/13   1039   HGB   --   18.8*   PLT   --   450   NA  140   --    POTASSIUM  3.9   --    CR  0.99   --         IMPRESSION:                                                    Reason for surgery/procedure: fistulotomy rectum  Diagnosis/reason for consult: Pre operative exam    The proposed surgical procedure is considered LOW risk.    REVISED CARDIAC RISK INDEX  The patient has the following serious cardiovascular risks for perioperative complications such as (MI, PE, VFib and 3  AV Block):  No serious cardiac risks  INTERPRETATION: 0 risks: Class I (very low risk - 0.4% complication rate)    The patient has the following additional risks for perioperative complications:  No identified additional risks      ICD-10-CM    1. Preop general physical exam Z01.818    2. Perianal fistula K60.3        RECOMMENDATIONS:                                                      APPROVAL GIVEN to proceed with proposed procedure, without further diagnostic evaluation       Signed Electronically by: BERNARDO Trammell CNP    Copy of this evaluation report is provided to requesting physician.    [facility] Preop Guidelines

## 2017-10-13 NOTE — DISCHARGE INSTRUCTIONS
Soak area daily with soap and water. Some mild redness or swelling is expected. If draining, cover with dry gauze.    High fiber diet with fiber supplements (ie Metamucil)    No lifting restrictions.    Okay to use ice pack over wound as necessary for comfort.    Use pain medication as necessary but avoid constipating side effects. Ibuprofen okay but avoid Tylenol as your pain medication already contains this.    Diet as tolerated. No restrictions.    Follow up with Dr Mcneil in 1-2 weeks.    *** Need to schedule an elective colonoscopy after healing from this surgery in 2-3 months. The order for this has already been placed.  Call [phone] to schedule.****      Post-operative Infection  What is a wound infection?    watch for signs of infection. Signs that the wound/incision is infected include:   Pus or cloudy fluid draining from the incision.   A pimple or yellow crust forming on the incision   The scab is increasing in size.   Increasing redness occurs around the incisions  A red streak is spreading from the wound toward the heart.   The incision has become extremely tender and/or hot   The lymph node draining that area of skin may become large and tender.   You may develop a fever over 100?F (37.8?C).     What is the cause?   Most skin infections follow breaks in the skin (for example, surgery,  from cuts, puncture wounds, animal bites, splinters, thorns, or burns). Bacteria (especially staphylococcus or streptococcus) then invade the wound and cause the infection.    Deeper wounds (like surgical incisions) are much more likely to become infected than superficial wounds (for example, scrapes).     What is the treatment?   Call your doctor's clinic if you feel you have the beginnings of an infection   Antibiotics You will probably need antibiotics prescribed by your healthcare provider. This medicine will kill the germs that are causing the wound infection. Try not to forget any of the doses.  Even if you feel  better in a few days, take the antibiotic until it is completely gone to keep the infection from flaring up again.    Fever and pain relief Take acetaminophen or ibuprofen if you develop a fever over 102?F (39?C)    How can I help prevent infections?   Wash around all new incisions vigorously with soap and water for 5 to 10 minutes to remove dirt and bacteria.      When should I call my child's healthcare provider?   Call IMMEDIATELY if:   The redness keeps spreading.   The wound becomes extremely painful.   Call during office hours if:   The fever is not gone 48 hours after you start taking an antibiotic.   The wound infection does not look better 3 days after your start taking an antibiotic.   The wound isn't completely healed within 10 days.   You have other questions or concerns.     Nausea and Vomiting  What are nausea and vomiting?   Nausea is the queasy feeling you usually have before you vomit. Vomiting is the forceful emptying (throwing up) of the stomach's contents through the mouth.   What causes nausea and vomiting?   Nausea and vomiting are symptoms that may occur with many conditions, such as:   Anesthesia medications   side effect of narcotic medicines  exposure to unpleasant odors or sights   stress and anxiety     How is it treated?   At first you should rest your stomach for a few hours by eating nothing solid and sipping only clear liquids. A little later you can eat soft bland foods that are easy to digest.   It is important to drink small amounts (1 to 4 ounces) often so that you do not become dehydrated. Gradually drink larger amounts of the clear fluids. If you vomit, wait an hour, then start over with a smaller amount of fluid.   Eat slowly and avoid foods that are acidic, spicy, fatty, or fibrous (such as meats, coarse grains, and raw vegetables). Also avoid extremely hot or cold food. In addition, avoid dairy products if you have diarrhea. You may start eating your normal diet again in 3  days or so, when all signs of illness have passed.   Rest as much as possible. Sit or lie down with your head propped up. Do not lie flat for at least 2 hours after eating. Nausea and vomiting usually last only a short period of time. If you have cramping or pain in your belly you can try putting a heating pad set at low or a covered hot water bottle on your belly. Never set a heating pad on high because you could get burned.   If you have been vomiting for more than a day or have had diarrhea for over 3 days, you may need to have an exam by your provider, including a check for dehydration. If you are very dehydrated, you may need to be given fluids intravenously (IV). In children and older adults dehydration can quickly become life threatening.   When should I call my healthcare provider?   Talk with your provider if you are unable to keep fluids down for more than 12 hours or if you have any of the following symptoms with nausea and vomiting:   severe headache or neck ache, or stiff neck   severe abdominal pain   diarrhea and vomiting that last more than 24 hours   blood in the vomited material that may look red, brown, or black, or like coffee grounds   bloody diarrhea   very forceful vomiting   signs of dehydration such as dry mouth, excessive thirst, little or no urination, severe weakness, dizziness, or lightheadedness.   If you have nausea and pain in the jaw, arm, shoulder, chest, or back; sweating; shortness of breath; or lightheadedness; call 911 for emergency care.                         Anesthesia   Same Day Surgery Discharge Instructions  Special Precautions After Surgery - Adult    1. It is not unusual to feel lightheaded or faint, up to 24 hours after surgery or while taking pain medication.  If you have these symptoms; sit for a few minutes before standing and have someone assist you when getting up.  2. You should rest and relax for the next 24 hours and must have someone stay with you for at least  24 hours after your discharge.  3. DO NOT DRIVE any vehicle or operate mechanical equipment for 24 hours following the end of your surgery.  DO NOT DRIVE while taking narcotic pain medications that have been prescribed by your physician.  If you had a limb operated on, you must be able to use it fully to drive.  4. DO NOT drink alcoholic beverages for 24 hours following surgery or while taking prescription pain medication.  5. Drink clear liquids (apple juice, ginger ale, broth, 7-Up, etc.).  Progress to your regular diet as you feel able.  6. Any questions call your physician and do not make important decisions for 24 hours.    __________________________________________________________________________________________________________________________________  IMPORTANT NUMBERS:    [facility] [place] Main Number:  [phone], [phone]  Pharmacy:  [phone]    [facility]:  [phone] call with any questions/concerns and for follow up apt   Emergency Room:  [phone]    Call AFTER HOURS with any questions/concerns

## 2017-10-13 NOTE — IP AVS SNAPSHOT
MRN:[id number]                      After Visit Summary   10/13/2017    John Kinsey    MRN: [id number]           Thank you!     Thank you for choosing [facility] for your care. Our goal is always to provide you with excellent care. Hearing back from our patients is one way we can continue to improve our services. Please take a few minutes to complete the written survey that you may receive in the mail after you visit with us. Thank you!        Patient Information     Date Of Birth          1960        About your hospital stay     You were admitted on:  October 13, 2017 You last received care in the:  [facility] PreOP/Phase II    You were discharged on:  October 13, 2017       Who to Call     For medical emergencies, please call 911.  For non-urgent questions about your medical care, please call your primary care provider or clinic, [phone]  For questions related to your surgery, please call your surgery clinic        Attending Provider     Provider Haiedr Resendiz MD Surgery       Primary Care Provider Office Phone # Fax #    Indira Maya [phone]      Further instructions from your care team       Soak area daily with soap and water. Some mild redness or swelling is expected. If draining, cover with dry gauze.    High fiber diet with fiber supplements (ie Metamucil)    No lifting restrictions.    Okay to use ice pack over wound as necessary for comfort.    Use pain medication as necessary but avoid constipating side effects. Ibuprofen okay but avoid Tylenol as your pain medication already contains this.    Diet as tolerated. No restrictions.    Follow up with Dr Mcneil in 1-2 weeks.    *** Need to schedule an elective colonoscopy after healing from this surgery in 2-3 months. The order for this has already been placed.  Call [phone] to schedule.****      Post-operative Infection  What is a wound infection?    watch for signs of infection. Signs  that the wound/incision is infected include:   Pus or cloudy fluid draining from the incision.   A pimple or yellow crust forming on the incision   The scab is increasing in size.   Increasing redness occurs around the incisions  A red streak is spreading from the wound toward the heart.   The incision has become extremely tender and/or hot   The lymph node draining that area of skin may become large and tender.   You may develop a fever over 100?F (37.8?C).     What is the cause?   Most skin infections follow breaks in the skin (for example, surgery,  from cuts, puncture wounds, animal bites, splinters, thorns, or burns). Bacteria (especially staphylococcus or streptococcus) then invade the wound and cause the infection.    Deeper wounds (like surgical incisions) are much more likely to become infected than superficial wounds (for example, scrapes).     What is the treatment?   Call your doctor's clinic if you feel you have the beginnings of an infection   Antibiotics You will probably need antibiotics prescribed by your healthcare provider. This medicine will kill the germs that are causing the wound infection. Try not to forget any of the doses.  Even if you feel better in a few days, take the antibiotic until it is completely gone to keep the infection from flaring up again.    Fever and pain relief Take acetaminophen or ibuprofen if you develop a fever over 102?F (39?C)    How can I help prevent infections?   Wash around all new incisions vigorously with soap and water for 5 to 10 minutes to remove dirt and bacteria.      When should I call my child's healthcare provider?   Call IMMEDIATELY if:   The redness keeps spreading.   The wound becomes extremely painful.   Call during office hours if:   The fever is not gone 48 hours after you start taking an antibiotic.   The wound infection does not look better 3 days after your start taking an antibiotic.   The wound isn't completely healed within 10 days.   You  have other questions or concerns.     Nausea and Vomiting  What are nausea and vomiting?   Nausea is the queasy feeling you usually have before you vomit. Vomiting is the forceful emptying (throwing up) of the stomach's contents through the mouth.   What causes nausea and vomiting?   Nausea and vomiting are symptoms that may occur with many conditions, such as:   Anesthesia medications   side effect of narcotic medicines  exposure to unpleasant odors or sights   stress and anxiety     How is it treated?   At first you should rest your stomach for a few hours by eating nothing solid and sipping only clear liquids. A little later you can eat soft bland foods that are easy to digest.   It is important to drink small amounts (1 to 4 ounces) often so that you do not become dehydrated. Gradually drink larger amounts of the clear fluids. If you vomit, wait an hour, then start over with a smaller amount of fluid.   Eat slowly and avoid foods that are acidic, spicy, fatty, or fibrous (such as meats, coarse grains, and raw vegetables). Also avoid extremely hot or cold food. In addition, avoid dairy products if you have diarrhea. You may start eating your normal diet again in 3 days or so, when all signs of illness have passed.   Rest as much as possible. Sit or lie down with your head propped up. Do not lie flat for at least 2 hours after eating. Nausea and vomiting usually last only a short period of time. If you have cramping or pain in your belly you can try putting a heating pad set at low or a covered hot water bottle on your belly. Never set a heating pad on high because you could get burned.   If you have been vomiting for more than a day or have had diarrhea for over 3 days, you may need to have an exam by your provider, including a check for dehydration. If you are very dehydrated, you may need to be given fluids intravenously (IV). In children and older adults dehydration can quickly become life threatening.   When  should I call my healthcare provider?   Talk with your provider if you are unable to keep fluids down for more than 12 hours or if you have any of the following symptoms with nausea and vomiting:   severe headache or neck ache, or stiff neck   severe abdominal pain   diarrhea and vomiting that last more than 24 hours   blood in the vomited material that may look red, brown, or black, or like coffee grounds   bloody diarrhea   very forceful vomiting   signs of dehydration such as dry mouth, excessive thirst, little or no urination, severe weakness, dizziness, or lightheadedness.   If you have nausea and pain in the jaw, arm, shoulder, chest, or back; sweating; shortness of breath; or lightheadedness; call 911 for emergency care.                         Anesthesia   Same Day Surgery Discharge Instructions  Special Precautions After Surgery - Adult    1. It is not unusual to feel lightheaded or faint, up to 24 hours after surgery or while taking pain medication.  If you have these symptoms; sit for a few minutes before standing and have someone assist you when getting up.  2. You should rest and relax for the next 24 hours and must have someone stay with you for at least 24 hours after your discharge.  3. DO NOT DRIVE any vehicle or operate mechanical equipment for 24 hours following the end of your surgery.  DO NOT DRIVE while taking narcotic pain medications that have been prescribed by your physician.  If you had a limb operated on, you must be able to use it fully to drive.  4. DO NOT drink alcoholic beverages for 24 hours following surgery or while taking prescription pain medication.  5. Drink clear liquids (apple juice, ginger ale, broth, 7-Up, etc.).  Progress to your regular diet as you feel able.  6. Any questions call your physician and do not make important decisions for 24  "hours.    __________________________________________________________________________________________________________________________________  IMPORTANT NUMBERS:    [facility] [place] Main Number:  [phone], [phone]  Pharmacy:  [phone]    Surgery Specialty Clinic:  [phone] call with any questions/concerns and for follow up apt   Emergency Room:  [phone]    Call AFTER HOURS with any questions/concerns                   Pending Results     No orders found from 10/11/2017 to 10/14/2017.            Admission Information     Date & Time Provider Department Dept. Phone    10/13/2017 Haider Mcneil MD [facility] PreOP/Phase [phone]      Your Vitals Were     Blood Pressure Pulse Temperature Respirations Height Weight    132/94 72 98.2  F (36.8  C) (Oral) 16 1.854 m (6' 1\") 91.6 kg (202 lb)    Pulse Oximetry BMI (Body Mass Index)                95% 26.65 kg/m2          MyChart Information     [employer] lets you send messages to your doctor, view your test results, renew your prescriptions, schedule appointments and more. To sign up, go to [email or web address] . Click on \"Log in\" on the left side of the screen, which will take you to the Welcome page. Then click on \"Sign up Now\" on the right side of the page.     You will be asked to enter the access code listed below, as well as some personal information. Please follow the directions to create your username and password.     Your access code is: [email or web address]  Expires: 1/10/2018  8:57 AM     Your access code will  in 90 days. If you need help or a new code, please call your [facility] or [phone].        Care EveryWhere ID     This is your Care EveryWhere ID. This could be used by other organizations to access your [facility] medical records  [id number]        Equal Access to Services     JUDSON GHOSH: kat Monreal, loy couch. So [facility] " [phone].    ATENCIÓN: Si juve mata, tiene a mccormack disposición servicios gratuitos de asistencia lingüística. Deb schwab [phone].    We comply with applicable federal civil rights laws and [place] laws. We do not discriminate on the basis of race, color, national origin, age, disability, sex, sexual orientation, or gender identity.               Review of your medicines      START taking        Dose / Directions    oxyCODONE-acetaminophen 5-325 MG per tablet   Commonly known as:  PERCOCET   Used for:  Post-op pain        Dose:  1-2 tablet   Take 1-2 tablets by mouth every 6 hours as needed   Quantity:  60 tablet   Refills:  0         CONTINUE these medicines which have NOT CHANGED        Dose / Directions    sildenafil 50 MG tablet   Commonly known as:  VIAGRA        Dose:  50 mg   Take 50 mg by mouth   Refills:  0            Where to get your medicines      Some of these will need a paper prescription and others can be bought over the counter. Ask your nurse if you have questions.     Bring a paper prescription for each of these medications     oxyCODONE-acetaminophen 5-325 MG per tablet                Protect others around you: Learn how to safely use, store and throw away your medicines at www.disposemymeds.org.             Medication List: This is a list of all your medications and when to take them. Check marks below indicate your daily home schedule. Keep this list as a reference.      Medications           Morning Afternoon Evening Bedtime As Needed    oxyCODONE-acetaminophen 5-325 MG per tablet   Commonly known as:  PERCOCET   Take 1-2 tablets by mouth every 6 hours as needed   Last time this was given:  2 tablets on 10/13/2017 11:17 AM                                sildenafil 50 MG tablet   Commonly known as:  VIAGRA   Take 50 mg by mouth

## 2017-10-13 NOTE — ANESTHESIA POSTPROCEDURE EVALUATION
Patient: John Kinsey    Procedure(s):  Rectal Examination under Anesthesia and hemorrhoidectomy x1 - Wound Class: II-Clean Contaminated    Diagnosis:perianal fistula  Diagnosis Additional Information: No value filed.    Anesthesia Type:  MAC    Note:  Anesthesia Post Evaluation    Patient location during evaluation: Phase 2 and Bedside  Patient participation: Able to fully participate in evaluation  Level of consciousness: awake and alert  Pain management: adequate  Airway patency: patent  Cardiovascular status: acceptable  Respiratory status: acceptable  Hydration status: acceptable  PONV: none     Anesthetic complications: None          Last vitals:  Vitals:    10/13/17 0926 10/13/17 1045   BP: 120/87 (!) 132/94   Pulse:  72   Resp: 18 16   Temp: 36.8  C (98.2  F)    SpO2: 95% 95%         Electronically Signed By: Chico Cartagena CRNA, APRN CRNA  October 13, 2017  10:53 AM

## 2017-10-13 NOTE — ANESTHESIA PREPROCEDURE EVALUATION
Anesthesia Evaluation     .  Type: General and MAC    History of anesthetic complications   - PONV        ROS/MED HX    ENT/Pulmonary:     (+)tobacco use, Past use , . .    Neurologic:  - neg neurologic ROS     Cardiovascular:  - neg cardiovascular ROS       METS/Exercise Tolerance:  >4 METS   Hematologic:  - neg hematologic  ROS       Musculoskeletal:  - neg musculoskeletal ROS       GI/Hepatic:  - neg GI/hepatic ROS       Renal/Genitourinary:  - ROS Renal section negative       Endo:  - neg endo ROS       Psychiatric:  - neg psychiatric ROS       Infectious Disease:  - neg infectious disease ROS       Malignancy:      - no malignancy   Other:    - neg other ROS                 Physical Exam  Normal systems: cardiovascular, pulmonary and dental    Airway   Mallampati: II  TM distance: >3 FB  Neck ROM: full    Dental     Cardiovascular   Rhythm and rate: regular and normal      Pulmonary    breath sounds clear to auscultation                    Anesthesia Plan      History & Physical Review  History and physical reviewed and following examination; no interval change.    ASA Status:  1 .    NPO Status:  > 8 hours    Plan for MAC Reason for MAC:  Deep or markedly invasive procedure (G8)  PONV prophylaxis:  Ondansetron (or other 5HT-3) and Dexamethasone or Solumedrol       Postoperative Care      Consents  Anesthetic plan, risks, benefits and alternatives discussed with:  Patient..                          .

## 2017-10-13 NOTE — ANESTHESIA CARE TRANSFER NOTE
Patient: John Kinsey    Procedure(s):  Rectal Examination under Anesthesia and hemorrhoidectomy x1 - Wound Class: II-Clean Contaminated    Diagnosis: perianal fistula  Diagnosis Additional Information: No value filed.    Anesthesia Type:   MAC     Note:  Airway :Nasal Cannula  Patient transferred to:Phase II  Handoff Report: Identifed the Patient, Identified the Reponsible Provider, Reviewed the pertinent medical history, Discussed the surgical course, Reviewed Intra-OP anesthesia mangement and issues during anesthesia, Set expectations for post-procedure period and Allowed opportunity for questions and acknowledgement of understanding      Vitals: (Last set prior to Anesthesia Care Transfer)    CRNA VITALS  10/13/2017 1011 - 10/13/2017 1047      10/13/2017             Pulse: 83    SpO2: 95 %                Electronically Signed By: Chico Cartagena CRNA, APRN CRNA  October 13, 2017  10:47 AM

## 2017-10-13 NOTE — IP AVS SNAPSHOT
[facility] PreOP/Phase II    [address] [address]    Phone:  [phone]    Fax:  [phone]                                       After Visit Summary   10/13/2017    John Kinsey    MRN: [id number]           After Visit Summary Signature Page     I have received my discharge instructions, and my questions have been answered. I have discussed any challenges I see with this plan with the nurse or doctor.    ..........................................................................................................................................  Patient/Patient Representative Signature      ..........................................................................................................................................  Patient Representative Print Name and Relationship to Patient    ..................................................               ................................................  Date                                            Time    ..........................................................................................................................................  Reviewed by Signature/Title    ...................................................              ..............................................  Date                                                            Time

## 2017-10-14 NOTE — OP NOTE
DATE OF PROCEDURE:  10/13/2017      PREOPERATIVE DIAGNOSIS:  Anal pain.      POSTOPERATIVE DIAGNOSIS:  Enlarged external hemorrhoids.        PROCEDURE:  Rectal exam under anesthesia and hemorrhoidectomy.      SURGEON:  Haider Mcneil MD      ASSISTANT:  IVONNE Mendoza needed for expertise in retraction, hemostasis and suctioning.      ANESTHESIA:  MAC.      ANESTHESIOLOGIST:  Chico Cartagena CRNA      FINDINGS:  Enlarged external hemorrhoid at the 9 o'clock position, successfully excised and the presence of old blood in the rectum.      INDICATIONS:  John Kinsey is a 57-year-old male seen in clinic complaining of left-sided anorectal pain.  The patient had this pain for quite sometime.  Has history of hidradenitis with infection and fistula formation.  The patient was scheduled for a rectal exam under anesthesia.      CONSENT:  Risks, benefits, alternatives and complications were discussed with the patient, including the possibility of infection, bleeding or abscess formation.  The patient understood and wished to proceed.      PROCEDURE:  The patient was taken to the operating room and placed in the prone jackknife position.  Monitored anesthesia care was initiated.  His buttocks were cleaned and draped in a sterile manner.  A surgical timeout was performed.  Two grams of Ancef were used as perioperative antibiotics.  The anal speculum was lubricated and gently inserted into the rectum.  On physical exam, he had scarring present the posterior midline from previous fistulotomies but today he did not have a fistula, instead he had enlarged external hemorrhoid at the left-sided 9 o'clock position.  Also present deep in the rectum was old blood from an unknown source.  As the hemorrhoid was only thing causing problems on the left side it was excised.  A suture of 3-0 chromic was placed at the apex at the dentate line and the hemorrhoid was then excised using electrocautery and sent to pathology.  Bleeding was  controlled with electrocautery and finally the mucosa was oversewn using a running locking suture with a 3-0 chromic.  Finally, the entire area was injected with 20 mL of Exparel solution.  A lubricated anal plug was inserted into the anal canal for pressure.  The gauze was placed over the area and the patient was placed into mesh underwear.  He was transferred back to same day surgery for recovery.      Following a stable recovery, he will be discharged home with a high fiber diet.      ACTIVITY:  As tolerated.      DISABILITY:  None.      MEDICATIONS:  Prescription written for Percocet.      INSTRUCTIONS:  The patient advised to wash his wounds daily with soap and water and to follow up with me in 1-2 weeks.      ESTIMATED BLOOD LOSS:  5 mL      IV FLUID:  373 mL.         QUEENIE BRONSON             D: 10/13/2017 10:47   T: 10/14/2017 00:43   MT: [id number]      Name:     SHAGGY HAYDEN   MRN:      [id number]        Account:        [id number]   :      1960           Procedure Date: 10/13/2017      Document: [id number]

## 2017-10-16 ENCOUNTER — TELEPHONE (OUTPATIENT)
Dept: SURGERY | Facility: CLINIC | Age: 57
End: 2017-10-16

## 2017-10-16 LAB — COPATH REPORT: NORMAL

## 2017-10-18 ENCOUNTER — OFFICE VISIT (OUTPATIENT)
Dept: SURGERY | Facility: CLINIC | Age: 57
End: 2017-10-18
Payer: COMMERCIAL

## 2017-10-18 VITALS
SYSTOLIC BLOOD PRESSURE: 133 MMHG | HEART RATE: 90 BPM | HEIGHT: 73 IN | DIASTOLIC BLOOD PRESSURE: 82 MMHG | BODY MASS INDEX: 26.77 KG/M2 | TEMPERATURE: 97.8 F | WEIGHT: 202 LBS

## 2017-10-18 DIAGNOSIS — L05.91 PILONIDAL CYST: ICD-10-CM

## 2017-10-18 DIAGNOSIS — G89.18 POST-OP PAIN: Primary | ICD-10-CM

## 2017-10-18 PROCEDURE — 99024 POSTOP FOLLOW-UP VISIT: CPT | Performed by: SURGERY

## 2017-10-18 RX ORDER — OXYCODONE AND ACETAMINOPHEN 10; 325 MG/1; MG/1
1 TABLET ORAL EVERY 6 HOURS PRN
Qty: 60 TABLET | Refills: 0 | Status: SHIPPED | OUTPATIENT
Start: 2017-10-18 | End: 2018-03-27

## 2017-10-18 NOTE — MR AVS SNAPSHOT
After Visit Summary   10/18/2017    John Kinsey    MRN: [id number]           Patient Information     Date Of Birth          1960        Visit Information        Provider Department      10/18/2017 9:00 AM Haider Mcneil MD [facility]        Today's Diagnoses     Post-op pain    -  1    Pilonidal cyst          Care Instructions    Per Dr. Mcneil's instructions          Follow-ups after your visit        Additional Services     COLORECTAL SURGERY REFERRAL       Your provider has referred you to: [facility]: Colon and Rectal Surgery [facility] [phone]   [email or web address]    Referral Reason(s): pilonidal cyst  Special Concerns: None  This referral is: Elective (week +)  It is OK to leave a message on patient's voicemail.    Please be aware that coverage of these services is subject to the terms and limitations of your health insurance plan.  Call member services at your health plan with any benefit or coverage questions.      Please bring the following with you to your appointment:    (1) Any X-Rays, CTs or MRIs which have been performed.  Contact the facility where they were done to arrange for  prior to your scheduled appointment.    (2) List of current medications  (3) This referral request   (4) Any documents/labs given to you for this referral                  Who to contact     If you have questions or need follow up information about today's clinic visit or your schedule please contact [facility] directly at [phone].  Normal or non-critical lab and imaging results will be communicated to you by MyChart, letter or phone within 4 business days after the clinic has received the results. If you do not hear from us within 7 days, please contact the clinic through MyChart or phone. If you have a critical or abnormal lab result, we will notify you by phone as soon as possible.  Submit refill requests  "through [employer] or call your pharmacy and they will forward the refill request to us. Please allow 3 business days for your refill to be completed.          Additional Information About Your Visit        [employer] Information     [employer] lets you send messages to your doctor, view your test results, renew your prescriptions, schedule appointments and more. To sign up, go to [email or web address] . Click on \"Log in\" on the left side of the screen, which will take you to the Welcome page. Then click on \"Sign up Now\" on the right side of the page.     You will be asked to enter the access code listed below, as well as some personal information. Please follow the directions to create your username and password.     Your access code is: [email or web address]  Expires: 1/10/2018  8:57 AM     Your access code will  in 90 days. If you need help or a new code, please call your [facility] clinic or [phone].        Care EveryWhere ID     This is your Care EveryWhere ID. This could be used by other organizations to access your [facility] medical records  [id number]        Your Vitals Were     Pulse Temperature Height BMI (Body Mass Index)          90 97.8  F (36.6  C) (Oral) 1.854 m (6' 1\") 26.65 kg/m2         Blood Pressure from Last 3 Encounters:   10/18/17 133/82   10/13/17 (!) 130/99   10/12/17 109/71    Weight from Last 3 Encounters:   10/18/17 91.6 kg (202 lb)   10/13/17 91.6 kg (202 lb)   10/12/17 92 kg (202 lb 12.8 oz)              We Performed the Following     COLORECTAL SURGERY REFERRAL          Today's Medication Changes          These changes are accurate as of: 10/18/17  9:09 AM.  If you have any questions, ask your nurse or doctor.               These medicines have changed or have updated prescriptions.        Dose/Directions    * oxyCODONE-acetaminophen 5-325 MG per tablet   Commonly known as:  PERCOCET   This may have changed:  Another medication with the same name was added. Make sure you understand how and " when to take each.   Used for:  Post-op pain        Dose:  1-2 tablet   Take 1-2 tablets by mouth every 6 hours as needed   Quantity:  60 tablet   Refills:  0       * oxyCODONE-acetaminophen  MG per tablet   Commonly known as:  PERCOCET   This may have changed:  You were already taking a medication with the same name, and this prescription was added. Make sure you understand how and when to take each.   Used for:  Post-op pain   Changed by:  Haider Mcneil MD        Dose:  1 tablet   Take 1 tablet by mouth every 6 hours as needed for moderate to severe pain   Quantity:  60 tablet   Refills:  0       * Notice:  This list has 2 medication(s) that are the same as other medications prescribed for you. Read the directions carefully, and ask your doctor or other care provider to review them with you.         Where to get your medicines      Some of these will need a paper prescription and others can be bought over the counter.  Ask your nurse if you have questions.     Bring a paper prescription for each of these medications     oxyCODONE-acetaminophen  MG per tablet                Primary Care Provider Office Phone # Fax #    Indira Prince Francesco [phone]       [facility] [address] [address]        Equal Access to Services     BOOKER DIAS : Hadii fátima ku hadasho Sotysonali, waaxda luqadaha, qaybta kaalmada adeegyada, loy saldana. So [facility] [phone].    ATENCIÓN: Si habla español, tiene a mccormack disposición servicios gratuitos de asistencia lingüística. Deb al [phone].    We comply with applicable federal civil rights laws and [place] laws. We do not discriminate on the basis of race, color, national origin, age, disability, sex, sexual orientation, or gender identity.            Thank you!     Thank you for choosing [facility]  for your care. Our goal is always to provide you with excellent care. Hearing back from our patients  is one way we can continue to improve our services. Please take a few minutes to complete the written survey that you may receive in the mail after your visit with us. Thank you!             Your Updated Medication List - Protect others around you: Learn how to safely use, store and throw away your medicines at www.disposemymeds.org.          This list is accurate as of: 10/18/17  9:09 AM.  Always use your most recent med list.                   Brand Name Dispense Instructions for use Diagnosis    * oxyCODONE-acetaminophen 5-325 MG per tablet    PERCOCET    60 tablet    Take 1-2 tablets by mouth every 6 hours as needed    Post-op pain       * oxyCODONE-acetaminophen  MG per tablet    PERCOCET    60 tablet    Take 1 tablet by mouth every 6 hours as needed for moderate to severe pain    Post-op pain       sildenafil 50 MG tablet    VIAGRA     Take 50 mg by mouth        * Notice:  This list has 2 medication(s) that are the same as other medications prescribed for you. Read the directions carefully, and ask your doctor or other care provider to review them with you.

## 2017-10-18 NOTE — NURSING NOTE
"Initial /82 (BP Location: Right arm, Patient Position: Chair, Cuff Size: Adult Regular)  Pulse 90  Temp 97.8  F (36.6  C) (Oral)  Ht 1.854 m (6' 1\")  Wt 91.6 kg (202 lb)  BMI 26.65 kg/m2 Estimated body mass index is 26.65 kg/(m^2) as calculated from the following:    Height as of this encounter: 1.854 m (6' 1\").    Weight as of this encounter: 91.6 kg (202 lb). .    Claudia Chino MA    "

## 2017-10-18 NOTE — PROGRESS NOTES
57-year-old male here for follow-up following rectal exam under anesthesia and hemorrhoidectomy. Patient reporting extreme pain from his hemorrhoidectomy after the local anesthetic wore off. Patient is concerned because his hemorrhoids were not the issue for which he wanted surgery. I explained to him that during surgery I could not appreciate any other morbidities and no evidence of inflammation such as a flared up pilonidal cyst. The only morbidity I could find was a left-sided hemorrhoid which I removed. I explained to the patient that I would get him a referral to the [place] [facility] for a second opinion on the pilonidal cyst. I reassured him the pain from the hemorrhoidectomy will settle down and renewed his pain medication. Patient will follow up as necessary.    Haider Mcneil MD

## 2018-02-01 ENCOUNTER — TELEPHONE (OUTPATIENT)
Dept: FAMILY MEDICINE | Facility: CLINIC | Age: 58
End: 2018-02-01

## 2018-02-01 NOTE — TELEPHONE ENCOUNTER
Reason for Call:  Other flu    Detailed comments: Patient thinks he has the flu and would like to talk to a nurse.    Phone Number Patient can be reached at: Home number on file [phone] (home)    Best Time: any    Can we leave a detailed message on this number? YES    Call taken on 2/1/2018 at 3:33 PM by Trinh Dangelo

## 2018-02-01 NOTE — TELEPHONE ENCOUNTER
Spoke with pt and he has on-set of symptoms 24 hours ago but does not fit a high-risk category.  Provided pt with Home Care measures and ED precautions.  Pt states understanding.    Nilda FAY RN      Influenza-Like Illness (SHIRA) Protocol    John ARIANA Kinsey      Age: 57 year old     YOB: 1960    Are you currently sick or have you had close contact with someone who is currently sick?   Yes, this patient is currently sick.     Adult Clinical Evaluation    Is this patient experiencing ANY of the following?  Unconsciousness or unresponsiveness No   Difficulty breathing or swallowing No   Blue or dusky lips, skin, or nail beds No   Chest pain No   Severe confusion or delirium No   Seizure activity: ongoing or stopped No   Severe dehydration or signs of shock No   Patient sounds very sick on the phone No     Is this patient experiencing ANY of the following?  Fever > 104 or shaking chills No   Wheezing with minimal response to usual wheezing medications or new wheezing No   Repeated vomiting or diarrhea with signs of dehydration (no urination within last 12 hours) No   Flu-like symptoms that initially improved but returned with fever and a worse cough No   Stiff or painful neck No   Severe headache No     Does the patient have any of the following?  Measured fever > 100 degrees Yes   Chills or feels very warm to the touch Yes   Cough Yes   Sore throat No   Muscle/ body aches Yes   Headaches No   Fatigue (tiredness) Yes     Nursing Plan      Below are conditions which place adults at increased risk for the more severe complications of influenza.    Does this patient have ANY of the following conditions?  Chronic pulmonary disease such as asthma or COPD No   Heart disease (CHF, CAD, anticoag due to arrhythmia) No   Liver disease (hepatitis, liver failure, cirrhosis) No   Kidney disease (renal failure, insufficiency or dialysis) No   Metabolic disorder (e.g. diabetes) No   Neuromuscular disorder (MS, MD NOVA,  myasthenia gravis) No   Compromised ability to handle respiratory secretions No   Hematologic disorder (e.g. sickle cell disease) No   HIV / AIDS No   Chemotherapy or radiation within the last 3 months No   Received an organ or a bone marrow transplant No   Taking Prednisone in excess of 20mg daily No   Is age 65 years or older No   Is pregnant, thinks she may be pregnant or is within two weeks after delivery No   Is a resident of a chronic care facility No   Is patient  or [place] native  No     Patient does not fall into high risk category.  Offered Home Care Education.  If no improvement in 3-5 days, patient should be seen by a provider.    If further questions/concerns or if new symptoms develop, call your PCP or [facility] as soon as possible.      Provided home care instructions    General home care instruction:      Avoid contact with people in your household who are at increased risk for more severe complications of influenza (such as pregnant women or people who have a chronic health condition, for example diabetes, heart disease, asthma, or emphysema).    Stay home from work, school, childcare or other public places until your fever (37.8 degrees Celsius [100 degrees Fahrenheit]) has been gone for at least 24 hours, except to seek medical care. (Fever should be gone without the use of fever-reducing medications.) Use a surgical mask if available, or cover your mouth and nose with a tissue if possible if you need to seek medical care. Contact your work place, school, or  as they may have longer exclusion times.    You may continue to shed virus after your fever is gone. Limit your contact with high-risk individuals for 10 days after your symptoms started and be especially careful to cover your coughs/sneezes and wash your hands.    Cover your cough and wash your hands often, and especially after coughing, sneezing, blowing your nose.    Drink plenty of fluids (such as  water, broth, sports drinks, electrolyte beverages for children) to prevent dehydration.    Avoid tobacco and second hand smoke.    Get plenty of rest.    Use over-the-counter pain relievers as needed per  instructions.    Do not give aspirin (acetylsalicylic acid) or products that contain aspirin (e.g. bismuth subsalicylate   Pepto Bismol) to children or teenagers 18 years or younger.    A small number of people with influenza do not have fever. If you have respiratory symptoms and are at increased risk for complications of influenza, contact your health care provider to discuss these symptoms.    For parents of infants:    If possible, only family members who are not sick should care for infants.    Wash your hands with soap and water, or an alcohol-based hand rub (if your hands are not visibly soiled) before caring for your infant.    Cover your mouth and nose with a tissue when coughing or sneezing, and clean your hands.    Contact a health care provider to discuss your illness within 1-2 days if you are    Pregnant    Immunocompromised    Call 911 if you experience:    Difficulty breathing or shortness of breath    Pain or pressure in the chest    Confusion or less responsive than normal    Seizure activity: ongoing or stopped    Severe dehydration or signs of shock     Blue or dusky lips, skin, or nail beds    If further questions/concerns or if new symptoms develop, call your PCP or [facility] as soon as possible.    When to seek medical attention    Contact your health care provider right away if you experience:    A painful sore throat accompanied by fever persists for more than 48 hours    Ear pain, sinus pain, persistent vomiting and/or diarrhea    Oral temperature greater than 104  Fahrenheit (40  Celsius)    Dehydration (e.g., mouth feeling dry, dizzy when sitting/standing, decreased urine output)    Severe or persistent vomiting; unable to keep fluids down    Improvement in  flu-like symptoms (fever and cough or sore throat) but then return of fever and worse cough or sore throat    Not drinking enough fluid    Any other concerns not stated above      Additional educational resources include:    http://www.[employer].com    http://www.cdc.gov/flu/  SHANITA LAND

## 2018-03-08 ENCOUNTER — TELEPHONE (OUTPATIENT)
Dept: SURGERY | Facility: CLINIC | Age: 58
End: 2018-03-08

## 2018-03-08 NOTE — TELEPHONE ENCOUNTER
Patient called to see if his referral was in to come be seen in our clinic. Since patient hasn't been seen by our clinic since 2013 I suggested he make an appointment with our NP-C Yumiko Rosario. Patient was also wondering what the waiting time to be seen would be and I stated our surgeons are booked until April and Yumiko was usually booked 1-2 weeks out. Patient stated understanding of this plan of care and would get a referral and call to make an appointment.

## 2018-03-23 ENCOUNTER — TELEPHONE (OUTPATIENT)
Dept: SURGERY | Facility: CLINIC | Age: 58
End: 2018-03-23

## 2018-03-23 NOTE — TELEPHONE ENCOUNTER
Left message for patient regarding visit with Dr. Goldberg on 3/27/18. No recent notes, instruct patient to call back if he has been seen some where else for his fistula. Last OV notes were back in 2013.    Dennise APONTE LPN

## 2018-03-27 ENCOUNTER — OFFICE VISIT (OUTPATIENT)
Dept: SURGERY | Facility: CLINIC | Age: 58
End: 2018-03-27
Payer: COMMERCIAL

## 2018-03-27 ENCOUNTER — HOSPITAL ENCOUNTER (OUTPATIENT)
Facility: CLINIC | Age: 58
End: 2018-03-27
Attending: COLON & RECTAL SURGERY | Admitting: COLON & RECTAL SURGERY
Payer: COMMERCIAL

## 2018-03-27 VITALS
BODY MASS INDEX: 27.3 KG/M2 | HEART RATE: 73 BPM | WEIGHT: 206 LBS | DIASTOLIC BLOOD PRESSURE: 84 MMHG | TEMPERATURE: 98.5 F | SYSTOLIC BLOOD PRESSURE: 120 MMHG | HEIGHT: 73 IN | OXYGEN SATURATION: 97 %

## 2018-03-27 DIAGNOSIS — K60.30 PERIANAL FISTULA: ICD-10-CM

## 2018-03-27 DIAGNOSIS — K62.5 RECTAL BLEEDING: ICD-10-CM

## 2018-03-27 DIAGNOSIS — K62.5 RECTAL BLEEDING: Primary | ICD-10-CM

## 2018-03-27 LAB
ERYTHROCYTE [DISTWIDTH] IN BLOOD BY AUTOMATED COUNT: 13.9 % (ref 10–15)
HCT VFR BLD AUTO: 56.1 % (ref 40–53)
HGB BLD-MCNC: 18.5 G/DL (ref 13.3–17.7)
MCH RBC QN AUTO: 28.7 PG (ref 26.5–33)
MCHC RBC AUTO-ENTMCNC: 33 G/DL (ref 31.5–36.5)
MCV RBC AUTO: 87 FL (ref 78–100)
PLATELET # BLD AUTO: 527 10E9/L (ref 150–450)
RBC # BLD AUTO: 6.45 10E12/L (ref 4.4–5.9)
WBC # BLD AUTO: 12.2 10E9/L (ref 4–11)

## 2018-03-27 ASSESSMENT — ENCOUNTER SYMPTOMS
BLOOD IN STOOL: 1
RECTAL PAIN: 1
ABDOMINAL PAIN: 1

## 2018-03-27 ASSESSMENT — PAIN SCALES - GENERAL: PAINLEVEL: MODERATE PAIN (4)

## 2018-03-27 NOTE — PROGRESS NOTES
Colon and Rectal Surgery Clinic Note    RE: John Kinsey  : 1960  HELADIO: 3/27/2018    John Kinsey is a pleasant 57 year old male with history of hidradenitis and perianal fistulas who presents today for anal fistula.     HPI: John underwent perianal fistulotomy with colonoscopy with Dr. Haider Deleon on 2013 after endorectal ultrasound showed a superficial fistula. He continued to have perianal pain intermittently followed by bloody and purulent drainage for an external site near his tailbone. This occurs about every 6 weeks. He reports bright red blood with bowel movements. This is enough to fill to toilet bowl and with clots on occasion. He sometimes feels he needs to pass stool and only passes blood. The bleeding has gotten worse in the past few months. He states that he feels fatigued because of it but denies any dizziness or lightheadedness. He has not had his hemoglobin checked recently. The bleeding is worse when he takes ibuprofen. He denies any black or tarry stools. He does have LLQ abdominal pain intermittently.   He underwent a colonoscopy at the time of his surgery in .     PSH/PMH: He had EUA with perianal fistulotomy X2 with Dr. Mcneil on 17. Pain persisted and he subsequently underwent EUA with hemorrhoidectomy with Dr. Mcneil 10/13/17. He additionally has had excision of bilateral axillary scar tissue in fistulous tracts for hidradenitis with Dr. Mcneil on 10/11/13 and excision of scrotal abscess with Dr. Paul Huber on 10/13/2015. 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 He denies any other medical problems. No difficulty with anesthesia in the past except for nausea and vomiting.    Family History:  His father had 26 polyps at the age of 40 but no subsequent polyps and no colon cancer in his family. No family history of IBD.    Social History: He is a former smoker and quit two and a  "half years ago. 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\".     Physical exam: Examination was chaperoned by Yumiko Brunson NP.  On exam here is a pit in the  cleft that is well healed without any surrounding induration, fluctuance, or drainage. Scarring in the left posterior and left lateral perianal positions with a palpable sphincter defect left posterolateral. No palpable abscess or fistula tract and no drainage. Non tender. SAI with palpable fistulotomy scar in the left posterior position. Anoscopy with internal hemorrhoids and some visible scarring but no visible internal fistula tract.    Plan: No visible fistula tract on exam. Scarring present in both the perianal and pilonidal areas. Discussed that this could represent possible hidradenitis, perianal fistula, or pilonidal disease with fistula. Recommended a 3T MRI to further evaluate. If this is pilonidal disease or perianal fistula would recommend EUA. Discussed possible fistulotomy versus seton drain placement. Discussed the risks of bleeding, infection, and fecal incontinence. If no evidence of a fistula on MRI would consider having him meet with dermatology for possible perianal hidradenitis.   Advised a colonoscopy for rectal bleeding. He would like to do this while he is in town and we will try to get him in later this week. Will check hemoglobin today.  Patient's questions were answered to his stated satisfaction and he is in agreement with this plan.    Total face to face time was 30 minutes, >50% counseling.      Medical history:  Past Medical History:   Diagnosis Date     Anal fistula 3/20/13     Motion sickness      PONV (postoperative nausea and vomiting)        Surgical history:  Past Surgical History:   Procedure Laterality Date     COLONOSCOPY  2013    Procedure: COLONOSCOPY;  Diagnostic Colonoscopy,Anal Exam, Anal Fistulotomy " ;  Surgeon: Haider Deleon MD;  Location: [facility]     ENT SURGERY      T&A     EXAM UNDER ANESTHESIA, FISTULOTOMY RECTUM, COMBINED N/A 4/21/2017    Procedure: COMBINED EXAM UNDER ANESTHESIA, FISTULOTOMY RECTUM;  Rectal Examination under Anesthesia and Fistulotomy X2;  Surgeon: Haider Mcneil MD;  Location[facility]     EXCISE HIDRADENITIS (LOCATION)  10/10/2013    Procedure: EXCISE HIDRADENITIS (LOCATION);  Excision of bilateral axillary scarring;  Surgeon: Haider Mcneil MD;  [facility]     FISTULOTOMY RECTUM  4/11/2013    Procedure: FISTULOTOMY RECTUM;;  Surgeon: Haider Deleon MD;  [facility]     FISTULOTOMY RECTUM N/A 10/13/2017    Procedure: FISTULOTOMY RECTUM;  Rectal Examination under Anesthesia and hemorrhoidectomy x1;  Surgeon: Haider Mcneil MD;  [facility]     HYDROCELECTOMY SCROTAL N/A 10/13/2015    Procedure: HYDROCELECTOMY SCROTAL;  Surgeon: VINCENT Huber MD;  [facility]     ORTHOPEDIC SURGERY  3/24/12    Left shoulder surgery       Family history:  Family History   Problem Relation Age of Onset     Colon Polyps Father      Colon Cancer No family hx of      Crohn Disease No family hx of      Ulcerative Colitis No family hx of      Anesthesia Reaction No family hx of        Medications:  No current outpatient prescriptions on file.     Allergies:  The patientis allergic to nkda [no known drug allergies].    Social history:  Social History   Substance Use Topics     Smoking status: Former Smoker     Types: Cigarettes     Smokeless tobacco: Never Used     Alcohol use Yes      Comment: 2 beers weekly     Marital status: .    Review of Systems:  Nursing Notes:   Tamie Spann LPN  3/27/2018 10:32 AM  Signed  Chief Complaint   Patient presents with     Clinic Care Coordination - Initial     New patient consult, perianal fistula.       Vitals:    03/27/18 1026   BP: 120/84   BP Location: Left arm   Patient Position: Chair   Cuff Size: Adult Large   Pulse: 73   Temp:  "98.5  F (36.9  C)   TempSrc: Oral   SpO2: 97%   Weight: 206 lb   Height: 6' 1\"       Body mass index is 27.18 kg/(m^2).    TERESITA Arana MD   Professor and Chief  Division of Colon and Rectal Surgery  [facility]      Referring Provider:  Haider Mcneil MD  [address] [place] [address]     Primary Care Provider:  Indira Maya  "

## 2018-03-27 NOTE — MR AVS SNAPSHOT
After Visit Summary   3/27/2018    John Kinsey    MRN: [id number]           Patient Information     Date Of Birth          1960        Visit Information        Provider Department      3/27/2018 11:00 AM Francisco J Goldberg MD [facility] Colon and Rectal Surgery        Today's Diagnoses     Rectal bleeding    -  1    Perianal fistula           Follow-ups after your visit        Additional Services     GASTROENTEROLOGY ADULT REF PROCEDURE ONLY       Last Lab Result: Creatinine (mg/dL)       Date                     Value                 04/19/2017               0.99             ----------  Body mass index is 27.18 kg/(m^2).     Needed:  No  Language:  English    Patient will be contacted to schedule procedure.     Please be aware that coverage of these services is subject to the terms and limitations of your health insurance plan.  Call member services at your health plan with any benefit or coverage questions.  Any procedures must be performed at a [facility] facility OR coordinated by your clinic's referral office.    Please bring the following with you to your appointment:    (1) Any X-Rays, CTs or MRIs which have been performed.  Contact the facility where they were done to arrange for  prior to your scheduled appointment.    (2) List of current medications   (3) This referral request   (4) Any documents/labs given to you for this referral                  Your next 10 appointments already scheduled     Mar 29, 2018 10:00 AM CDT   (Arrive by 9:45 AM)   MR PELVIS W/O & W CONTRAST with [id number]   [facility] ([facility])    [address] [place] [id number] [phone]           Take your medicines as usual, unless your doctor tells you not to. Bring a list of your current medicines to your exam (including vitamins, minerals and over-the-counter drugs).  You may or may not receive intravenous (IV) contrast for this  exam pending the discretion of the Radiologist.  You do not need to do anything special to prepare.  The MRI machine uses a strong magnet. Please wear clothes without metal (snaps, zippers). A sweatsuit works well, or we may give you a hospital gown.  Please remove any body piercings and hair extensions before you arrive. You will also remove watches, jewelry, hairpins, wallets, dentures, partial dental plates and hearing aids. You may wear contact lenses, and you may be able to wear your rings. We have a safe place to keep your personal items, but it is safer to leave them at home.  **IMPORTANT** THE INSTRUCTIONS BELOW ARE ONLY FOR THOSE PATIENTS WHO HAVE BEEN PRESCRIBED SEDATION OR GENERAL ANESTHESIA DURING THEIR MRI PROCEDURE:  IF YOUR DOCTOR PRESCRIBED ORAL SEDATION (take medicine to help you relax during your exam):   You must get the medicine from your doctor (oral medication) before you arrive. Bring the medicine to the exam. Do not take it at home. You ll be told when to take it upon arriving for your exam.   Arrive one hour early. Bring someone who can take you home after the test. Your medicine will make you sleepy. After the exam, you may not drive, take a bus or take a taxi by yourself.  IF YOUR DOCTOR PRESCRIBED IV SEDATION:   Arrive one hour early. Bring someone who can take you home after the test. Your medicine will make you sleepy. After the exam, you may not drive, take a bus or take a taxi by yourself.   No eating 6 hours before your exam. You may have clear liquids up until 4 hours before your exam. (Clear liquids include water, clear tea, black coffee and fruit juice without pulp.)  IF YOUR DOCTOR PRESCRIBED ANESTHESIA (be asleep for your exam):   Arrive 1 1/2 hours early. Bring someone who can take you home after the test. You may not drive, take a bus or take a taxi by yourself.   No eating 8 hours before your exam. You may have clear liquids up until 4 hours before your exam. (Clear liquids  include water, clear tea, black coffee and fruit juice without pulp.)   You will spend four to five hours in the recovery room.  Please call the Imaging Department at your exam site with any questions.            Mar 30, 2018   Procedure with Francisco J Goldberg MD   [facility] [place] Endoscopy ([facility], [facility])    [address]  [place] [address]   [phone]           The [facility] is located on the corner of [address] and [address] on the [facility]. It is easily accessible from virtually any point in the [facility] area, via [employer] and [employer].              Future tests that were ordered for you today     Open Future Orders        Priority Expected Expires Ordered    MR Pelvis w/o & w Contrast Routine  3/28/2019 3/27/2018    CBC with platelets Routine  2018 3/27/2018            Who to contact     Please call your clinic at [phone] to:    Ask questions about your health    Make or cancel appointments    Discuss your medicines    Learn about your test results    Speak to your doctor            Additional Information About Your Visit        [facility] Information     [employer] is an electronic gateway that provides easy, online access to your medical records. With [employer], you can request a clinic appointment, read your test results, renew a prescription or communicate with your care team.     To sign up for [employer] visit the website at [email or web address]   You will be asked to enter the access code listed below, as well as some personal information. Please follow the directions to create your username and password.     Your access code is: [email or web address]  Expires: 2018  6:30 AM     Your access code will  in 90 days. If you need help or a new code, please contact your [facility] or call [phone] for assistance.        Care EveryWhere ID     This is your Care EveryWhere ID.  "This could be used by other organizations to access your [place] medical records  [id number]        Your Vitals Were     Pulse Temperature Height Pulse Oximetry BMI (Body Mass Index)       73 98.5  F (36.9  C) (Oral) 6' 1\" 97% 27.18 kg/m2        Blood Pressure from Last 3 Encounters:   03/27/18 120/84   10/18/17 133/82   10/13/17 (!) 130/99    Weight from Last 3 Encounters:   03/27/18 206 lb   10/18/17 202 lb   10/13/17 202 lb              We Performed the Following     GASTROENTEROLOGY ADULT REF PROCEDURE ONLY        Primary Care Provider Office Phone # Fax #    Indira aMya [phone]       [facility] [address] [address]        Equal Access to Services     JUDSON DIAS : Scott navarro Somay, waaxda luqadaha, qaybta kaalmada adetereseyajohn, loy daniels . So [facility] [phone].    ATENCIÓN: Si habla español, tiene a mccormack disposición servicios gratuitos de asistencia lingüística. Llame al [phone].    We comply with applicable federal civil rights laws and [place] laws. We do not discriminate on the basis of race, color, national origin, age, disability, sex, sexual orientation, or gender identity.            Thank you!     Thank you for choosing [facility] COLON AND RECTAL SURGERY  for your care. Our goal is always to provide you with excellent care. Hearing back from our patients is one way we can continue to improve our services. Please take a few minutes to complete the written survey that you may receive in the mail after your visit with us. Thank you!             Your Updated Medication List - Protect others around you: Learn how to safely use, store and throw away your medicines at www.disposemymeds.org.      Notice  As of 3/27/2018 12:13 PM    You have not been prescribed any medications.      "

## 2018-03-27 NOTE — NURSING NOTE
"Chief Complaint   Patient presents with     Clinic Care Coordination - Initial     New patient consult, perianal fistula.       Vitals:    03/27/18 1026   BP: 120/84   BP Location: Left arm   Patient Position: Chair   Cuff Size: Adult Large   Pulse: 73   Temp: 98.5  F (36.9  C)   TempSrc: Oral   SpO2: 97%   Weight: 206 lb   Height: 6' 1\"       Body mass index is 27.18 kg/(m^2).    Dennise APONTE LPN                  "

## 2018-03-27 NOTE — LETTER
3/27/2018       RE: John Kinsey  [address] [address]     Dear Colleague,    Thank you for referring your patient, John Kinsey, to the [facility] COLON AND RECTAL SURGERY at [facility]. Please see a copy of my visit note below.    Colon and Rectal Surgery Clinic Note    RE: John Kinsey  : 1960  HELADIO: 3/27/2018    John Kinsey is a pleasant 57 year old male with history of hidradenitis and perianal fistulas who presents today for anal fistula.     HPI: John underwent perianal fistulotomy with colonoscopy with Dr. Haider Deleon on 2013 after endorectal ultrasound showed a superficial fistula. He continued to have perianal pain intermittently followed by bloody and purulent drainage for an external site near his tailbone. This occurs about every 6 weeks. He reports bright red blood with bowel movements. This is enough to fill to toilet bowl and with clots on occasion. He sometimes feels he needs to pass stool and only passes blood. The bleeding has gotten worse in the past few months. He states that he feels fatigued because of it but denies any dizziness or lightheadedness. He has not had his hemoglobin checked recently. The bleeding is worse when he takes ibuprofen. He denies any black or tarry stools. He does have LLQ abdominal pain intermittently.   He underwent a colonoscopy at the time of his surgery in .     PSH/PMH: He had EUA with perianal fistulotomy X2 with Dr. Mcneil on 17. Pain persisted and he subsequently underwent EUA with hemorrhoidectomy with Dr. Mcneil 10/13/17. He additionally has had excision of bilateral axillary scar tissue in fistulous tracts for hidradenitis with Dr. Mcneil on 10/11/13 and excision of scrotal abscess with Dr. Paul Huber on 10/13/2015. 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 He denies any other medical  "problems. No difficulty with anesthesia in the past except for nausea and vomiting.    Family History:  His father had 26 polyps at the age of 40 but no subsequent polyps and no colon cancer in his family. No family history of IBD.    Social History: He is a former smoker and quit two and a half years ago. 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\".     Physical exam: Examination was chaperoned by Yumiko Brunson NP.  On exam here is a pit in the cathi cleft that is well healed without any surrounding induration, fluctuance, or drainage. Scarring in the left posterior and left lateral perianal positions with a palpable sphincter defect left posterolateral. No palpable abscess or fistula tract and no drainage. Non tender. SAI with palpable fistulotomy scar in the left posterior position. Anoscopy with internal hemorrhoids and some visible scarring but no visible internal fistula tract.    Plan: No visible fistula tract on exam. Scarring present in both the perianal and pilonidal areas. Discussed that this could represent possible hidradenitis, perianal fistula, or pilonidal disease with fistula. Recommended a 3T MRI to further evaluate. If this is pilonidal disease or perianal fistula would recommend EUA. Discussed possible fistulotomy versus seton drain placement. Discussed the risks of bleeding, infection, and fecal incontinence. If no evidence of a fistula on MRI would consider having him meet with dermatology for possible perianal hidradenitis.   Advised a colonoscopy for rectal bleeding. He would like to do this while he is in town and we will try to get him in later this week. Will check hemoglobin today.  Patient's questions were answered to his stated satisfaction and he is in agreement with this plan.    Total face to face time was 30 minutes, >50% counseling.      Medical history:  Past Medical History: "   Diagnosis Date     Anal fistula 3/20/13     Motion sickness      PONV (postoperative nausea and vomiting)        Surgical history:  Past Surgical History:   Procedure Laterality Date     COLONOSCOPY  4/11/2013    Procedure: COLONOSCOPY;  Diagnostic Colonoscopy,Anal Exam, Anal Fistulotomy ;  Surgeon: Haider Deleon MD;  [facility]     ENT SURGERY      T&A     EXAM UNDER ANESTHESIA, FISTULOTOMY RECTUM, COMBINED N/A 4/21/2017    Procedure: COMBINED EXAM UNDER ANESTHESIA, FISTULOTOMY RECTUM;  Rectal Examination under Anesthesia and Fistulotomy X2;  Surgeon: Haider Mcneil MD;  [facility]     EXCISE HIDRADENITIS (LOCATION)  10/10/2013    Procedure: EXCISE HIDRADENITIS (LOCATION);  Excision of bilateral axillary scarring;  Surgeon: Haider Mcneil MD;  [facility]     FISTULOTOMY RECTUM  4/11/2013    Procedure: FISTULOTOMY RECTUM;;  Surgeon: Haider Deleon MD;  [facility]     FISTULOTOMY RECTUM N/A 10/13/2017    Procedure: FISTULOTOMY RECTUM;  Rectal Examination under Anesthesia and hemorrhoidectomy x1;  Surgeon: Haider Mcneil MD;  [facility]     HYDROCELECTOMY SCROTAL N/A 10/13/2015    Procedure: HYDROCELECTOMY SCROTAL;  Surgeon: VINCENT Huber MD;  [facility]     ORTHOPEDIC SURGERY  3/24/12    Left shoulder surgery       Family history:  Family History   Problem Relation Age of Onset     Colon Polyps Father      Colon Cancer No family hx of      Crohn Disease No family hx of      Ulcerative Colitis No family hx of      Anesthesia Reaction No family hx of        Medications:  No current outpatient prescriptions on file.     Allergies:  The patientis allergic to nkda [no known drug allergies].    Social history:  Social History   Substance Use Topics     Smoking status: Former Smoker     Types: Cigarettes     Smokeless tobacco: Never Used     Alcohol use Yes      Comment: 2 beers weekly     Marital status: .    Review of Systems:  Nursing Notes:   Tamie Spann  "LPN  3/27/2018 10:32 AM  Signed  Chief Complaint   Patient presents with     Clinic Care Coordination - Initial     New patient consult, perianal fistula.       Vitals:    03/27/18 1026   BP: 120/84   BP Location: Left arm   Patient Position: Chair   Cuff Size: Adult Large   Pulse: 73   Temp: 98.5  F (36.9  C)   TempSrc: Oral   SpO2: 97%   Weight: 206 lb   Height: 6' 1\"       Body mass index is 27.18 kg/(m^2).    Dennise APONTE LPN       Referring Provider:  Haider Mcneil MD  [address] [place] [address]     Primary Care Provider:  Indira Maya    Again, thank you for allowing me to participate in the care of your patient.      Sincerely,    Francisoc J Goldberg MD      "

## 2018-03-29 ENCOUNTER — OFFICE VISIT (OUTPATIENT)
Dept: SURGERY | Facility: CLINIC | Age: 58
End: 2018-03-29
Payer: COMMERCIAL

## 2018-03-29 ENCOUNTER — RADIANT APPOINTMENT (OUTPATIENT)
Dept: MRI IMAGING | Facility: CLINIC | Age: 58
End: 2018-03-29
Attending: NURSE PRACTITIONER
Payer: COMMERCIAL

## 2018-03-29 ENCOUNTER — APPOINTMENT (OUTPATIENT)
Dept: SURGERY | Facility: CLINIC | Age: 58
End: 2018-03-29
Payer: COMMERCIAL

## 2018-03-29 ENCOUNTER — ALLIED HEALTH/NURSE VISIT (OUTPATIENT)
Dept: SURGERY | Facility: CLINIC | Age: 58
End: 2018-03-29
Payer: COMMERCIAL

## 2018-03-29 ENCOUNTER — ANESTHESIA EVENT (OUTPATIENT)
Dept: GASTROENTEROLOGY | Facility: CLINIC | Age: 58
End: 2018-03-29

## 2018-03-29 VITALS
HEART RATE: 75 BPM | HEIGHT: 73 IN | RESPIRATION RATE: 16 BRPM | OXYGEN SATURATION: 96 % | SYSTOLIC BLOOD PRESSURE: 135 MMHG | TEMPERATURE: 97.9 F | WEIGHT: 202.4 LBS | DIASTOLIC BLOOD PRESSURE: 85 MMHG | BODY MASS INDEX: 26.83 KG/M2

## 2018-03-29 DIAGNOSIS — Z01.818 PREOP EXAMINATION: Primary | ICD-10-CM

## 2018-03-29 DIAGNOSIS — K62.5 RECTAL BLEEDING: ICD-10-CM

## 2018-03-29 DIAGNOSIS — K60.30 PERIANAL FISTULA: ICD-10-CM

## 2018-03-29 DIAGNOSIS — K62.5 RECTAL BLEEDING: Primary | ICD-10-CM

## 2018-03-29 RX ORDER — GADOBUTROL 604.72 MG/ML
10 INJECTION INTRAVENOUS ONCE
Status: COMPLETED | OUTPATIENT
Start: 2018-03-29 | End: 2018-03-29

## 2018-03-29 RX ADMIN — GADOBUTROL 9 ML: 604.72 INJECTION INTRAVENOUS at 09:56

## 2018-03-29 ASSESSMENT — LIFESTYLE VARIABLES: TOBACCO_USE: 1

## 2018-03-29 NOTE — ANESTHESIA PREPROCEDURE EVALUATION
Anesthesia Evaluation     . Pt has had prior anesthetic. Type: General and MAC    History of anesthetic complications   - PONV        ROS/MED HX    ENT/Pulmonary: Comment: Smoking history 1.5 PPD X 10 years    (+)tobacco use, Past use , . .    Neurologic:  - neg neurologic ROS     Cardiovascular:  - neg cardiovascular ROS   (+) ----. : . . . :. . Previous cardiac testing date:results:date: results:ECG reviewed date:4/2017 results:SR date: results:          METS/Exercise Tolerance:  >4 METS   Hematologic:  - neg hematologic  ROS       Musculoskeletal:   (+) , , other musculoskeletal- multiple shoulder surgeries      GI/Hepatic:     (+) bowel prep, Other GI/Hepatic Anal fistula, rectal bleeding, diarrhea      Renal/Genitourinary:  - ROS Renal section negative       Endo:  - neg endo ROS       Psychiatric:  - neg psychiatric ROS       Infectious Disease:  - neg infectious disease ROS       Malignancy:      - no malignancy   Other:    (+) No chance of pregnancy C-spine cleared: N/A, no H/O Chronic Pain,no other significant disability   - neg other ROS                 Physical Exam  Normal systems: dental    Airway   Mallampati: I  TM distance: >3 FB  Neck ROM: full    Dental     Cardiovascular   Rhythm and rate: regular and normal      Pulmonary    breath sounds clear to auscultation    Other findings: For further details of assessment, testing, and physical exam please see H and P completed on same date.           PAC Discussion and Assessment    ASA Classification: 1  Case is suitable for: ASC  Anesthetic techniques and relevant risks discussed: MAC with GA as backup  Invasive monitoring and risk discussed: No  Types:   Possibility and Risk of blood transfusion discussed: No  NPO instructions given:   Additional anesthetic preparation and risks discussed:   Needs early admission to pre-op area:   Other:     PAC Resident/NP Anesthesia Assessment:  John Kinsey is a 57 year old male scheduled to undergo anal exam  under anesthesia, colonoscopy on 3/30/18 by Dr. Goldberg. He has the following specific operative considerations:   - RCRI : No serious cardiac risks.    - VTE risk: 0.5%  - RAISSA # of risks 2/8 = Low risk  - Risk of PONV score = 2.  If > 2, anti-emetic intervention recommended.    Multiple anesthesia records available.     --Rectal bleeding and diarrhea. Above procedure now planned with MAC. Patient comfortable with plan.   --History of perianal fistula, s/p fistulotomy in 2013. Continued bleeding and purulent drainage from site near tailbone. Will also be examined.    --PONV. Significant history. Final decisions regarding prophylaxis by Anesthesia on DOS.   --No cardiac history, symptoms or meds. EKG above. Good exercise tolerance.    --Former smoker. 1.5 PPD X 10 years. Quit 3 years ago. Denies pulmonary symptoms.    --Multiple shoulder surgeries with ongoing pain and some limited mobility. Will require careful positioning.     Patient was discussed with Dr Brewer.      Reviewed and Signed by PAC Mid-Level Provider/Resident  Mid-Level Provider/Resident: BERNARDO Maya, CNS  Date: 3/29/18  Time: 3:31pm    Attending Anesthesiologist Anesthesia Assessment:  57 year old for EUA, colonoscopy, in the setting of perianal fistula.     Patient/case discussed with GUERA. No need to see patient. Patient is appropriate for the planned procedure without further work-up or medical management.      Reviewed and Signed by PAC Anesthesiologist  Anesthesiologist: mo  Date: 3/29/2018  Time:   Pass/Fail: Pass  Disposition:     PAC Pharmacist Assessment:        Pharmacist:   Date:   Time:                           .

## 2018-03-29 NOTE — MR AVS SNAPSHOT
After Visit Summary   3/29/2018    John Kinsey    MRN: [id number]           Patient Information     Date Of Birth          1960        Visit Information        Provider Department      3/29/2018 4:00 PM Rn, [facility]operative [facility]        Care Instructions    Preparing for Your Surgery      Name:  John Kinsey   MRN:  [id number]   :  1960   Today's Date:  3/29/2018     Arriving for surgery:  Surgery date:  3/30/18  Arrival time:  9:30AM  Please come to:     [facility]  [address] [place] [id number]     Parking is available in front of the Clinics and Surgery Center building from 5:30AM to 8:00PM.  -  Proceed to the 5th floor to check into the Ambulatory Surgery Center.              >> There will be patient concierges on the 1st and 5th floor, for assistance or an escort, if you would like.              >> Please call [phone] with any questions.    What can I eat or drink?  -  You may have solid food or milk products until two days prior to your surgery.  Follow bowel prep instructions, clear liquids the day prior to surgery.   -  You may have water, apple juice or 7up/Sprite until 2 hours prior to your surgery.    Which medicines can I take?  -  Do NOT take these medications in the morning, the day of surgery:  N/A    -  Please take these medications the day of surgery:  N/A     How do I prepare myself?  -  Take two showers: one the night before surgery; and one the morning of surgery.         Use Scrubcare or Hibiclens to wash from neck down.  You may use your own shampoo and conditioner. No other hair products.   -  Do NOT use lotion, powder, deodorant, or antiperspirant the day of your surgery.  -  Do NOT wear any jewelry.  -Do not bring your own medications to the hospital, except for inhalers and eye drops.  -  Bring your ID and insurance card.    Questions or Concerns:  If you have questions or concerns  regarding the day of surgery, please call the Preoperative Assessment Center (PAC), Monday-Friday 7AM-7PM:  [phone].  After surgery please call your surgeons office.                     Follow-ups after your visit        Your next 10 appointments already scheduled     Mar 29, 2018  4:00 PM CDT   (Arrive by 3:45 PM)   PAC RN ASSESSMENT with  Pac Rn   [facility] Preoperative Assessment Center ([facility])    [address] [place] [address]  [employer] [employer] [id number] [phone]            Mar 29, 2018  4:30 PM CDT   (Arrive by 4:15 PM)   PAC Anesthesia Consult with  Pac Anesthesiologist   [facility] Preoperative Assessment Center ([facility])    [address] [place] [address]  [employer] [employer] [id number] [phone]            Mar 29, 2018  4:45 PM CDT   LAB with  LAB   [facility] Lab ([facility])    [address] [place] [address]  [address] [employer] [id number] [phone]           Please do not eat 10-12 hours before your appointment if you are coming in fasting for labs on lipids, cholesterol, or glucose (sugar). This does not apply to pregnant women. Water, hot tea and black coffee (with nothing added) are okay. Do not drink other fluids, diet soda or chew gum.            Mar 30, 2018   Procedure with Francisco J Goldberg MD   [facility] Surgery and Procedure Center ([facility])    [address] [place] [address]  [employer] [employer] [id number] [phone]           Located in the Clinics and Surgery Center at [address] [place] [id number].   parking is very convenient and highly recommended.  is a $6 flat rate fee.  Both  and self parkers should enter the main arrival plaza from [facility]; parking attendants will direct you based on your parking preference.              Who to contact     Please call your clinic at [phone] to:    Ask questions about your health    Make or cancel  appointments    Discuss your medicines    Learn about your test results    Speak to your doctor            Additional Information About Your Visit        MyChart Information     [employer] is an electronic gateway that provides easy, online access to your medical records. With [employer], you can request a clinic appointment, read your test results, renew a prescription or communicate with your care team.     To sign up for [employer] visit the website at [email or web address]   You will be asked to enter the access code listed below, as well as some personal information. Please follow the directions to create your username and password.     Your access code is: [email or web address]  Expires: 2018  6:30 AM     Your access code will  in 90 days. If you need help or a new code, please contact your [facility] or call [phone] for assistance.        Care EveryWhere ID     This is your Care EveryWhere ID. This could be used by other organizations to access your [place] medical records  [id number]         Blood Pressure from Last 3 Encounters:   18 135/85   18 120/84   10/18/17 133/82    Weight from Last 3 Encounters:   18 91.8 kg (202 lb 6.4 oz)   18 93.4 kg (206 lb)   10/18/17 91.6 kg (202 lb)              Today, you had the following     No orders found for display       Primary Care Provider Office Phone # Fax #    Indira Prince Francesco [phone]       [facility] [address] [facility] [address]        Equal Access to Services     BOOKER DIAS AH: Hadii aad ku hadasho Soomaali, waaxda luqadaha, qaybta kaalmada adeegyada, waxay idiin hayaan denniseeg jacquelin la'johanna . So [facility] [phone].    ATENCIÓN: Si habla español, tiene a mccormack disposición servicios gratuitos de asistencia lingüística. Llame al [phone].    We comply with applicable federal civil rights laws and [place] laws. We do not discriminate on the basis of race, color, national  origin, age, disability, sex, sexual orientation, or gender identity.            Thank you!     Thank you for choosing [facility] PREOPERAT[facility]  for your care. Our goal is always to provide you with excellent care. Hearing back from our patients is one way we can continue to improve our services. Please take a few minutes to complete the written survey that you may receive in the mail after your visit with us. Thank you!             Your Updated Medication List - Protect others around you: Learn how to safely use, store and throw away your medicines at www.disposemymeds.org.      Notice  As of 3/29/2018  3:28 PM    You have not been prescribed any medications.

## 2018-03-29 NOTE — DISCHARGE INSTRUCTIONS
MRI Contrast Discharge Instructions    The IV contrast you received today will pass out of your body in your  urine. This will happen in the next 24 hours. You will not feel this process.  Your urine will not change color.    Drink at least 4 extra glasses of water or juice today (unless your doctor  has restricted your fluids). This reduces the stress on your kidneys.  You may take your regular medicines.    If you are on dialysis: It is best to have dialysis today.    If you have a reaction: Most reactions happen right away. If you have  any new symptoms after leaving the hospital (such as hives or swelling),  call your hospital at the correct number below. Or call your family doctor.  If you have breathing distress or wheezing, call 911.    Special instructions: ***    I have read and understand the above information.    Signature:______________________________________ Date:___________    Staff:__________________________________________ Date:___________     Time:__________    [place] [facility] Departments:    ___[place]: [phone]  ___[facility]: [phone]  ___[place]: [phone] ___[facility]: [phone]  ___[facility]: [phone]  ___[facility]: [phone]  ___[phone]  ___[facility]: [phone]  ___[phone]

## 2018-03-29 NOTE — PATIENT INSTRUCTIONS
Preparing for Your Surgery      Name:  John Kinsey   MRN:  [id number]   :  1960   Today's Date:  3/29/2018     Arriving for surgery:  Surgery date:  3/30/18  Arrival time:  9:30AM  Please come to:     [facility]  [address] [place] [phone]     Parking is available in front of the Clinics and Surgery Center building from 5:30AM to 8:00PM.  -  Proceed to the 5th floor to check into the Ambulatory Surgery Center.              >> There will be patient concierges on the 1st and 5th floor, for assistance or an escort, if you would like.              >> Please call [phone] with any questions.    What can I eat or drink?  -  You may have solid food or milk products until two days prior to your surgery.  Follow bowel prep instructions, clear liquids the day prior to surgery.   -  You may have water, apple juice or 7up/Sprite until 2 hours prior to your surgery.    Which medicines can I take?  -  Do NOT take these medications in the morning, the day of surgery:  N/A    -  Please take these medications the day of surgery:  N/A     How do I prepare myself?  -  Take two showers: one the night before surgery; and one the morning of surgery.         Use Scrubcare or Hibiclens to wash from neck down.  You may use your own shampoo and conditioner. No other hair products.   -  Do NOT use lotion, powder, deodorant, or antiperspirant the day of your surgery.  -  Do NOT wear any jewelry.  -Do not bring your own medications to the hospital, except for inhalers and eye drops.  -  Bring your ID and insurance card.    Questions or Concerns:  If you have questions or concerns regarding the day of surgery, please call the Preoperative Assessment Center (PAC), Monday-Friday 7AM-7PM:  [phone].  After surgery please call your surgeons office.

## 2018-03-29 NOTE — H&P
Pre-Operative H & P     CC:  Preoperative exam to assess for increased cardiopulmonary risk while undergoing surgery and anesthesia.    Date of Encounter: 3/29/2018  Primary Care Physician:  Indira Maya  Reason for visit: Rectal bleeding    HPI  John Kinsey is a 57 year old male who presents for pre-operative H & P in preparation for anal exam under anesthesia, colonoscopy with Dr. Goldberg on 3/30/18 at [facility]. History is obtained from the patient.     Patient who has been followed by Dr. Goldberg over time for perianal fistula. He is s/p fistulotomy in 2013. He returned to clinic on 3/27/18. He has continued to have perianal pain with bloody to purulent drainage from a site near his tailbone. In addition he is having worsening diarrhea and bright red rectal bleeding with clots. Above procedure is now planned for further evaluation.    Past Medical History  Past Medical History:   Diagnosis Date     Anal fistula 3/20/13     Motion sickness      PONV (postoperative nausea and vomiting)      Rectal bleeding        Past Surgical History  Past Surgical History:   Procedure Laterality Date     COLONOSCOPY  4/11/2013    Procedure: COLONOSCOPY;  Diagnostic Colonoscopy,Anal Exam, Anal Fistulotomy ;  Surgeon: Haider Deleon MD;  Location: [facility] OR     ENT SURGERY      T&A     EXAM UNDER ANESTHESIA, FISTULOTOMY RECTUM, COMBINED N/A 4/21/2017    Procedure: COMBINED EXAM UNDER ANESTHESIA, FISTULOTOMY RECTUM;  Rectal Examination under Anesthesia and Fistulotomy X2;  Surgeon: Haider Mcneil MD;  Location: [facility] OR     EXCISE HIDRADENITIS (LOCATION)  10/10/2013    Procedure: EXCISE HIDRADENITIS (LOCATION);  Excision of bilateral axillary scarring;  Surgeon: Haider Mcneil MD;  Location: [facility] OR     FISTULOTOMY RECTUM  4/11/2013    Procedure: FISTULOTOMY RECTUM;;  Surgeon: Haider Deleon MD;  Location: [facility] OR     FISTULOTOMY RECTUM N/A 10/13/2017    Procedure: FISTULOTOMY RECTUM;  Rectal  Examination under Anesthesia and hemorrhoidectomy x1;  Surgeon: Haider Mcneil MD;  Location: [facility] OR     HYDROCELECTOMY SCROTAL N/A 10/13/2015    Procedure: HYDROCELECTOMY SCROTAL;  Surgeon: VINCENT Huber MD;  Location: [facility] OR     LASIK BILATERAL  4 years ago     ORTHOPEDIC SURGERY  3/24/12    Left shoulder surgery       Hx of Blood transfusions/reactions: Denies.      Hx of abnormal bleeding or anti-platelet use: Denies.     Menstrual history: No LMP for male patient.    Steroid use in the last year: Denies.     Personal or FH with difficulty with Anesthesia:  PONV.    Prior to Admission Medications  No current outpatient prescriptions on file.       Allergies  Allergies   Allergen Reactions     Nkda [No Known Drug Allergies]        Social History  Social History     Social History     Marital status:      Spouse name: N/A     Number of children: N/A     Years of education: N/A     Occupational History     farmer Self     Social History Main Topics     Smoking status: Former Smoker     Packs/day: 1.50     Years: 10.00     Types: Cigarettes     Smokeless tobacco: Never Used     Alcohol use Yes      Comment: 2 beers weekly     Drug use: No     Sexual activity: Yes     Other Topics Concern     Parent/Sibling W/ Cabg, Mi Or Angioplasty Before 65f 55m? No     Social History Narrative       Family History  Family History   Problem Relation Age of Onset     Colon Polyps Father      DIABETES Mother      Colon Cancer No family hx of      Crohn Disease No family hx of      Ulcerative Colitis No family hx of      Anesthesia Reaction No family hx of        Review of Systems  ROS/MED HX  The complete review of systems is negative other than noted in the HPI or here.   ENT/Pulmonary: Comment: Smoking history 1.5 PPD X 10 years    (+)tobacco use, Past use , . .    Neurologic:  - neg neurologic ROS     Cardiovascular:  - neg cardiovascular ROS   (+) ----. : . . . :. . No previous cardiac testing       METS/Exercise  "Tolerance:  >4 METS   Hematologic:  - neg hematologic  ROS       Musculoskeletal:   (+) , , other musculoskeletal- multiple shoulder surgeries      GI/Hepatic:     (+) Other GI/Hepatic Anal fistula, rectal bleeding, diarrhea      Renal/Genitourinary:  - ROS Renal section negative       Endo:  - neg endo ROS       Psychiatric:  - neg psychiatric ROS       Infectious Disease:  - neg infectious disease ROS       Malignancy:      - no malignancy   Other:    (+) No chance of pregnancy C-spine cleared: N/A, no H/O Chronic Pain,no other significant disability   - neg other ROS     Physical Exam  Normal systems: dental    Airway   Mallampati: I  TM distance: >3 FB  Neck ROM: full    Temp: 97.9  F (36.6  C) Temp src: Oral BP: 135/85 Pulse: 75   Resp: 16 SpO2: 96 %         202 lbs 6.4 oz  6' 1\"   Body mass index is 26.7 kg/(m^2).       Physical Exam  Constitutional: Awake, alert, cooperative, no apparent distress, and appears stated age.  Eyes: Pupils equal, round and reactive to light, extra ocular muscles intact, sclera clear, conjunctiva normal.  HENT: Normocephalic, oral pharynx with moist mucus membranes, good dentition. No goiter appreciated.   Respiratory: Clear to auscultation bilaterally, no crackles or wheezing. No cough or obvious dyspnea.  Cardiovascular: Regular rate and rhythm, normal S1 and S2, and no murmur noted.  Carotids +2, no bruits. No edema. Palpable pulses to radial  DP and PT arteries.   GI: Normal bowel sounds, soft, non-distended, non-tender, no masses palpated, no hepatosplenomegaly.    Lymph/Hematologic: No cervical lymphadenopathy and no supraclavicular lymphadenopathy.  Genitourinary:  Deferred.   Skin: Warm and dry. Tan.  Musculoskeletal: Full ROM of neck. There is no redness, warmth, or swelling of the joints. Gross motor strength is normal.    Neurologic: Awake, alert, oriented to name, place and time. Cranial nerves II-XII are grossly intact. Gait is normal.   Neuropsychiatric: Calm, " cooperative. Normal affect.     Labs: (personally reviewed)  Lab Results   Component Value Date    WBC 12.2 2018     Lab Results   Component Value Date    RBC 6.45 2018     Lab Results   Component Value Date    HGB 18.5 2018     Lab Results   Component Value Date    HCT 56.1 2018     Lab Results   Component Value Date    MCV 87 2018     Lab Results   Component Value Date    MCH 28.7 2018     Lab Results   Component Value Date    MCHC 33.0 2018     Lab Results   Component Value Date    RDW 13.9 2018     Lab Results   Component Value Date     2018     Last Basic Metabolic Panel:  Lab Results   Component Value Date     2017      Lab Results   Component Value Date    POTASSIUM 3.9 2017     Lab Results   Component Value Date    CHLORIDE 106 2017     Lab Results   Component Value Date    JORDAN 8.9 2017     Lab Results   Component Value Date    CO2 28 2017     Lab Results   Component Value Date    BUN 20 2017     Lab Results   Component Value Date    CR 0.99 2017     Lab Results   Component Value Date    GLC 87 2017     EK2017 Sinus rhythm    Pelvic MRI 3/29/18  Impression:   1. No active perianal fistula or abscess is seen.  2. Mild inflammation involving a small amount of anal mucosa and  distal rectal mucosa compatible with proctitis. No intersphincteric or  perirectal inflammation.    3. Evidence of prior inflammation or fistulous tract posterior to the  anus without active fistula tract at the site.    Imaging and cardiac testing reviewed by this provider    Outside records reviewed from: Care Everywhere    ASSESSMENT and PLAN  John Kinsey is a 57 year old male scheduled to undergo anal exam under anesthesia, colonoscopy on 3/30/18 by Dr. Goldberg. He has the following specific operative considerations:   - RCRI : No serious cardiac risks.    - Anesthesia considerations:  Refer to PAC assessment in  anesthesia records  - VTE risk: 0.5%  - RAISSA # of risks 2/8 = Low risk  - Risk of PONV score = 2.  If > 2, anti-emetic intervention recommended.     --Rectal bleeding and diarrhea. Above procedure now planned with MAC. Patient comfortable with plan.   --History of perianal fistula, s/p fistulotomy in 2013. Continued bleeding and purulent drainage from site near tailbone. Will also be examined.    --PONV. Significant history. Final decisions regarding prophylaxis by Anesthesia on DOS.   --No cardiac history, symptoms or meds. EKG above. Good exercise tolerance.    --Former smoker. 1.5 PPD X 10 years. Quit 3 years ago. Denies pulmonary symptoms.    --Multiple shoulder surgeries with ongoing pain and some limited mobility. Will require careful positioning.     Arrival time, NPO, shower and medication instructions provided by nursing staff today. Preparing For Your Surgery handout given.    Patient was discussed with Dr Brewer.    BERNARDO Wilson CNS  Preoperative Assessment Center  [facility]  Clinic and Surgery Center  Phone: [phone]  Fax: [phone]

## 2018-03-30 ENCOUNTER — ANESTHESIA EVENT (OUTPATIENT)
Dept: SURGERY | Facility: AMBULATORY SURGERY CENTER | Age: 58
End: 2018-03-30

## 2018-03-30 ENCOUNTER — HOSPITAL ENCOUNTER (OUTPATIENT)
Facility: AMBULATORY SURGERY CENTER | Age: 58
End: 2018-03-30
Attending: COLON & RECTAL SURGERY
Payer: COMMERCIAL

## 2018-03-30 ENCOUNTER — TELEPHONE (OUTPATIENT)
Dept: SURGERY | Facility: CLINIC | Age: 58
End: 2018-03-30

## 2018-03-30 ENCOUNTER — SURGERY (OUTPATIENT)
Age: 58
End: 2018-03-30

## 2018-03-30 ENCOUNTER — CARE COORDINATION (OUTPATIENT)
Dept: OTHER | Facility: CLINIC | Age: 58
End: 2018-03-30

## 2018-03-30 ENCOUNTER — ANESTHESIA (OUTPATIENT)
Dept: SURGERY | Facility: AMBULATORY SURGERY CENTER | Age: 58
End: 2018-03-30

## 2018-03-30 VITALS
BODY MASS INDEX: 26.77 KG/M2 | OXYGEN SATURATION: 95 % | DIASTOLIC BLOOD PRESSURE: 75 MMHG | RESPIRATION RATE: 17 BRPM | HEIGHT: 73 IN | TEMPERATURE: 98.1 F | HEART RATE: 79 BPM | SYSTOLIC BLOOD PRESSURE: 135 MMHG | WEIGHT: 202 LBS

## 2018-03-30 PROCEDURE — 88305 TISSUE EXAM BY PATHOLOGIST: CPT | Performed by: COLON & RECTAL SURGERY

## 2018-03-30 RX ORDER — HYDROMORPHONE HYDROCHLORIDE 1 MG/ML
.3-.5 INJECTION, SOLUTION INTRAMUSCULAR; INTRAVENOUS; SUBCUTANEOUS EVERY 10 MIN PRN
Status: DISCONTINUED | OUTPATIENT
Start: 2018-03-30 | End: 2018-03-31 | Stop reason: HOSPADM

## 2018-03-30 RX ORDER — SODIUM CHLORIDE, SODIUM LACTATE, POTASSIUM CHLORIDE, CALCIUM CHLORIDE 600; 310; 30; 20 MG/100ML; MG/100ML; MG/100ML; MG/100ML
INJECTION, SOLUTION INTRAVENOUS CONTINUOUS
Status: DISCONTINUED | OUTPATIENT
Start: 2018-03-30 | End: 2018-03-30 | Stop reason: HOSPADM

## 2018-03-30 RX ORDER — MEPERIDINE HYDROCHLORIDE 25 MG/ML
12.5 INJECTION INTRAMUSCULAR; INTRAVENOUS; SUBCUTANEOUS
Status: DISCONTINUED | OUTPATIENT
Start: 2018-03-30 | End: 2018-03-31 | Stop reason: HOSPADM

## 2018-03-30 RX ORDER — SCOLOPAMINE TRANSDERMAL SYSTEM 1 MG/1
1 PATCH, EXTENDED RELEASE TRANSDERMAL
Status: DISCONTINUED | OUTPATIENT
Start: 2018-03-30 | End: 2018-03-30 | Stop reason: HOSPADM

## 2018-03-30 RX ORDER — ONDANSETRON 4 MG/1
4 TABLET, ORALLY DISINTEGRATING ORAL EVERY 30 MIN PRN
Status: DISCONTINUED | OUTPATIENT
Start: 2018-03-30 | End: 2018-03-31 | Stop reason: HOSPADM

## 2018-03-30 RX ORDER — FENTANYL CITRATE 50 UG/ML
25-50 INJECTION, SOLUTION INTRAMUSCULAR; INTRAVENOUS
Status: DISCONTINUED | OUTPATIENT
Start: 2018-03-30 | End: 2018-03-30 | Stop reason: HOSPADM

## 2018-03-30 RX ORDER — LIDOCAINE 40 MG/G
CREAM TOPICAL
Status: DISCONTINUED | OUTPATIENT
Start: 2018-03-30 | End: 2018-03-30 | Stop reason: HOSPADM

## 2018-03-30 RX ORDER — PROPOFOL 10 MG/ML
INJECTION, EMULSION INTRAVENOUS CONTINUOUS PRN
Status: DISCONTINUED | OUTPATIENT
Start: 2018-03-30 | End: 2018-03-30

## 2018-03-30 RX ORDER — ONDANSETRON 2 MG/ML
INJECTION INTRAMUSCULAR; INTRAVENOUS PRN
Status: DISCONTINUED | OUTPATIENT
Start: 2018-03-30 | End: 2018-03-30

## 2018-03-30 RX ORDER — FENTANYL CITRATE 50 UG/ML
25-50 INJECTION, SOLUTION INTRAMUSCULAR; INTRAVENOUS
Status: DISCONTINUED | OUTPATIENT
Start: 2018-03-30 | End: 2018-03-31 | Stop reason: HOSPADM

## 2018-03-30 RX ORDER — NALOXONE HYDROCHLORIDE 0.4 MG/ML
.1-.4 INJECTION, SOLUTION INTRAMUSCULAR; INTRAVENOUS; SUBCUTANEOUS
Status: DISCONTINUED | OUTPATIENT
Start: 2018-03-30 | End: 2018-03-31 | Stop reason: HOSPADM

## 2018-03-30 RX ORDER — ONDANSETRON 4 MG/1
4 TABLET, ORALLY DISINTEGRATING ORAL
Status: DISCONTINUED | OUTPATIENT
Start: 2018-03-30 | End: 2018-03-31 | Stop reason: HOSPADM

## 2018-03-30 RX ORDER — SODIUM CHLORIDE, SODIUM LACTATE, POTASSIUM CHLORIDE, CALCIUM CHLORIDE 600; 310; 30; 20 MG/100ML; MG/100ML; MG/100ML; MG/100ML
INJECTION, SOLUTION INTRAVENOUS CONTINUOUS
Status: DISCONTINUED | OUTPATIENT
Start: 2018-03-30 | End: 2018-03-31 | Stop reason: HOSPADM

## 2018-03-30 RX ORDER — LIDOCAINE HYDROCHLORIDE 20 MG/ML
INJECTION, SOLUTION INFILTRATION; PERINEURAL PRN
Status: DISCONTINUED | OUTPATIENT
Start: 2018-03-30 | End: 2018-03-30

## 2018-03-30 RX ORDER — PROPOFOL 10 MG/ML
INJECTION, EMULSION INTRAVENOUS PRN
Status: DISCONTINUED | OUTPATIENT
Start: 2018-03-30 | End: 2018-03-30

## 2018-03-30 RX ORDER — BUPIVACAINE HYDROCHLORIDE AND EPINEPHRINE 2.5; 5 MG/ML; UG/ML
INJECTION, SOLUTION INFILTRATION; PERINEURAL PRN
Status: DISCONTINUED | OUTPATIENT
Start: 2018-03-30 | End: 2018-03-30 | Stop reason: HOSPADM

## 2018-03-30 RX ORDER — KETAMINE HYDROCHLORIDE 10 MG/ML
INJECTION INTRAMUSCULAR; INTRAVENOUS PRN
Status: DISCONTINUED | OUTPATIENT
Start: 2018-03-30 | End: 2018-03-30

## 2018-03-30 RX ORDER — ACETAMINOPHEN 325 MG/1
975 TABLET ORAL ONCE
Status: DISCONTINUED | OUTPATIENT
Start: 2018-03-30 | End: 2018-03-30 | Stop reason: HOSPADM

## 2018-03-30 RX ORDER — ONDANSETRON 2 MG/ML
4 INJECTION INTRAMUSCULAR; INTRAVENOUS EVERY 30 MIN PRN
Status: DISCONTINUED | OUTPATIENT
Start: 2018-03-30 | End: 2018-03-31 | Stop reason: HOSPADM

## 2018-03-30 RX ADMIN — PROPOFOL 30 MG: 10 INJECTION, EMULSION INTRAVENOUS at 12:23

## 2018-03-30 RX ADMIN — PROPOFOL 100 MCG/KG/MIN: 10 INJECTION, EMULSION INTRAVENOUS at 12:20

## 2018-03-30 RX ADMIN — PROPOFOL 20 MG: 10 INJECTION, EMULSION INTRAVENOUS at 12:22

## 2018-03-30 RX ADMIN — PROPOFOL: 10 INJECTION, EMULSION INTRAVENOUS at 13:33

## 2018-03-30 RX ADMIN — PROPOFOL 10 MG: 10 INJECTION, EMULSION INTRAVENOUS at 12:55

## 2018-03-30 RX ADMIN — SODIUM CHLORIDE, SODIUM LACTATE, POTASSIUM CHLORIDE, CALCIUM CHLORIDE: 600; 310; 30; 20 INJECTION, SOLUTION INTRAVENOUS at 12:18

## 2018-03-30 RX ADMIN — BUPIVACAINE HYDROCHLORIDE AND EPINEPHRINE 45 ML: 2.5; 5 INJECTION, SOLUTION INFILTRATION; PERINEURAL at 13:35

## 2018-03-30 RX ADMIN — PROPOFOL: 10 INJECTION, EMULSION INTRAVENOUS at 12:53

## 2018-03-30 RX ADMIN — LIDOCAINE HYDROCHLORIDE 40 MG: 20 INJECTION, SOLUTION INFILTRATION; PERINEURAL at 12:20

## 2018-03-30 RX ADMIN — KETAMINE HYDROCHLORIDE 30 MG: 10 INJECTION INTRAMUSCULAR; INTRAVENOUS at 12:25

## 2018-03-30 RX ADMIN — SCOLOPAMINE TRANSDERMAL SYSTEM 1 PATCH: 1 PATCH, EXTENDED RELEASE TRANSDERMAL at 12:13

## 2018-03-30 RX ADMIN — KETAMINE HYDROCHLORIDE 10 MG: 10 INJECTION INTRAMUSCULAR; INTRAVENOUS at 13:25

## 2018-03-30 RX ADMIN — PROPOFOL 20 MG: 10 INJECTION, EMULSION INTRAVENOUS at 12:35

## 2018-03-30 RX ADMIN — KETAMINE HYDROCHLORIDE 10 MG: 10 INJECTION INTRAMUSCULAR; INTRAVENOUS at 12:56

## 2018-03-30 RX ADMIN — ONDANSETRON 4 MG: 2 INJECTION INTRAMUSCULAR; INTRAVENOUS at 12:19

## 2018-03-30 NOTE — DISCHARGE INSTRUCTIONS
[facility]  Home Care Following Anesthesia  For 24 hours after surgery:  1. Get plenty of rest.  A responsible adult must stay with you for at least 24 hours after you leave the surgery center.  2. Do not drive or use heavy equipment.  If you have weakness or tingling, don't drive or use heavy equipment until this feeling goes away.   3. Do not drink alcohol.   4. Avoid strenuous or risky activities.  Ask for help when climbing stairs.  5. You may feel lightheaded.  IF so, sit for a few minutes before standing.  Have someone help you get up.   6. If you have nausea (feel sick to your stomach): Drink only clear liquids such as apple juice, ginger ale, broth or 7-Up.  Rest may also help.  Be sure to drink enough fluids.  Move to a regular diet as you feel able.   7. You may have a slight fever.  Call the doctor if your fever is over 100 F (37.7 C) (taken under the tongue) or lasts longer than 24 hours.  8. You may have a dry mouth, a sore throat, muscle aches or trouble sleeping. These should go away after 24 hours.  9. Do not make important or legal decisions.               Tips for taking pain medications  To get the best pain relief possible, remember these points:    Take pain medications as directed, before pain becomes severe.    Pain medication can upset your stomach: taking it with food may help.    Constipation is a common side effect of pain medication. Drink plenty of  fluids.    Eat foods high in fiber. Take a stool softener if recommended by your doctor or pharmacist.    Do not drink alcohol, drive or operate machinery while taking pain medications.    Ask about other ways to control pain, such as with heat, ice or relaxation.    Tylenol/Acetaminophen Consumption  To help encourage the safe use of acetaminophen, the makers of TYLENOL  have lowered the maximum daily dose for single-ingredient Extra Strength TYLENOL  (acetaminophen) products sold in the [place] from 8  pills per day (4,000 mg) to 6 pills per day (3,000 mg). The dosing interval has also changed from 2 pills every 4-6 hours to 2 pills every 6 hours.    If you feel your pain relief is insufficient, you may take Tylenol/Acetaminophen in addition to your narcotic pain medication.     Be careful not to exceed 3,000 mg of Tylenol/Acetaminophen in a 24 hour period from all sources.    If you are taking extra strength Tylenol/acetaminophen (500 mg), the maximum dose is 6 tablets in 24 hours.    If you are taking regular strength acetaminophen (325 mg), the maximum dose is 9 tablets in 24 hours.    Call a doctor for any of the followin. Signs of infection (fever, growing tenderness at the surgery site, a large amount of drainage or bleeding, severe pain, foul-smelling drainage, redness, swelling).  2. It has been over 8 to 10 hours since surgery and you are still not able to urinate (pass water).  3. Headache for over 24 hours.  4. Numbness, tingling or weakness the day after surgery (if you had spinal anesthesia).  Your doctor is:       Dr. Francisco J Goldberg, Colon Rectal: [phone]               Or dial [phone] and ask for the resident on call for:  Colon Rectal  For emergency care, call the:  [facility]:  [phone] (TTY for hearing impaired: [phone])

## 2018-03-30 NOTE — ANESTHESIA CARE TRANSFER NOTE
Patient: John Kinsey    Procedure(s):  Exam Under Anesthesia Anus and Colonoscopy with biopsies, excision perianal skin lesion.   - Wound Class: II-Clean Contaminated    Diagnosis: Anal Fistula  Diagnosis Additional Information: No value filed.    Anesthesia Type:   MAC     Note:  Airway :Room Air  Patient transferred to:Phase II  Comments: Uneventful transport to Phase 2; IV patent; Pt responds appropriately to command; Pt comfortable; VSS: Report to RN; SpO2 100% RR 16; report to Brianoff Report: Identifed the Patient, Identified the Reponsible Provider, Reviewed the pertinent medical history, Discussed the surgical course, Reviewed Intra-OP anesthesia mangement and issues during anesthesia, Set expectations for post-procedure period and Allowed opportunity for questions and acknowledgement of understanding      Vitals: (Last set prior to Anesthesia Care Transfer)    CRNA VITALS  3/30/2018 1315 - 3/30/2018 1350      3/30/2018             Resp Rate (set): 10                Electronically Signed By: BERNARDO CAN CRNA  March 30, 2018  1:50 PM

## 2018-03-30 NOTE — BRIEF OP NOTE
[facility]    Brief Operative Note    Pre-operative diagnosis: Anal Fistula  Post-operative diagnosis Anal Fistula  Procedure: Procedure(s):  Exam Under Anesthesia Anus and Colonoscopy with biopsies, excision perianal skin lesion.   - Wound Class: II-Clean Contaminated  Surgeon: Surgeon(s) and Role:     * Francisco J Goldberg MD - Primary  Anesthesia: Monitor Anesthesia Care   Estimated blood loss: None  Drains: None  Specimens:   ID Type Source Tests Collected by Time Destination   A : cecal polyp Tissue Other SURGICAL PATHOLOGY EXAM Zully Diaz RN 3/30/2018 12:42 PM    B : Sigmoid colon biopsy Tissue Other SURGICAL PATHOLOGY EXAM Zully Diaz RN 3/30/2018 12:57 PM    C : distal rectum biopsy Tissue Other SURGICAL PATHOLOGY EXAM Zully Diaz RN 3/30/2018  1:10 PM    D : Right posterior rehana anal lesion Tissue Other SURGICAL PATHOLOGY EXAM Zully Diaz RN 3/30/2018  1:39 PM      Findings:   Patchy mucosal erythema with submucosal hemorrhage in sigmoid and distal rectum. Biopsies sent.     Posterior left induration without erythema or obvious tract, posterior right 5mm raised lesion excised,  Pilonidal sinus in dorsal midline, quiescent without tracking; well healed left lateral fistulotomy tract   Complications: None.  Implants: None.

## 2018-03-30 NOTE — ANESTHESIA PREPROCEDURE EVALUATION
Physical Exam  Normal systems: dental    Airway   Mallampati: I  TM distance: >3 FB  Neck ROM: full    Dental     Cardiovascular   Rhythm and rate: regular and normal      Pulmonary    breath sounds clear to auscultation    Other findings: For further details of assessment, testing, and physical exam please see H and P completed on same date.                Anesthesia Plan      History & Physical Review  History and physical reviewed and following examination; no interval change.    ASA Status:  1 .    NPO Status:  > 8 hours    Plan for MAC with Propofol and Intravenous induction. Maintenance will be TIVA.  Reason for MAC:  Deep or markedly invasive procedure (G8)  PONV prophylaxis:  Ondansetron (or other 5HT-3) and Dexamethasone or Solumedrol       Postoperative Care  Postoperative pain management:  IV analgesics, Oral pain medications and Multi-modal analgesia.      Consents  Anesthetic plan, risks, benefits and alternatives discussed with:  Patient..          ANESTHESIA PREOP EVALUATION    PROCEDURE: Procedure(s):  Exam Under Anesthesia Anus and Colonoscopy   - Wound Class: II-Clean Contaminated    HPI: John Kinsey is a 57 year old male who presents for above procedure.    PAST MEDICAL HISTORY:    Past Medical History:   Diagnosis Date     Anal fistula 3/20/13     Motion sickness      PONV (postoperative nausea and vomiting)      Rectal bleeding        PAST SURGICAL HISTORY:    Past Surgical History:   Procedure Laterality Date     COLONOSCOPY  4/11/2013    Procedure: COLONOSCOPY;  Diagnostic Colonoscopy,Anal Exam, Anal Fistulotomy ;  Surgeon: Haider Deleno MD;  Location: [facility] OR     ENT SURGERY      T&A     EXAM UNDER ANESTHESIA, FISTULOTOMY RECTUM, COMBINED N/A 4/21/2017    Procedure: COMBINED EXAM UNDER ANESTHESIA, FISTULOTOMY RECTUM;  Rectal Examination under Anesthesia and Fistulotomy X2;  Surgeon: Haider Mcneil MD;  Location: [facility] OR     EXCISE HIDRADENITIS (LOCATION)  10/10/2013     Procedure: EXCISE HIDRADENITIS (LOCATION);  Excision of bilateral axillary scarring;  Surgeon: Haider Mcneil MD;  Location: [facility]     FISTULOTOMY RECTUM  4/11/2013    Procedure: FISTULOTOMY RECTUM;;  Surgeon: Haider Deleon MD;  Location: [facility] OR     FISTULOTOMY RECTUM N/A 10/13/2017    Procedure: FISTULOTOMY RECTUM;  Rectal Examination under Anesthesia and hemorrhoidectomy x1;  Surgeon: Haider Mcneil MD;  Location: [facility] OR     HYDROCELECTOMY SCROTAL N/A 10/13/2015    Procedure: HYDROCELECTOMY SCROTAL;  Surgeon: VINCENT Huber MD;  Location: [facility] OR     LASIK BILATERAL  4 years ago     ORTHOPEDIC SURGERY  3/24/12    Left shoulder surgery       PAST ANESTHESIA HISTORY:     No personal or family h/o anesthesia problems    SOCIAL HISTORY:       Social History   Substance Use Topics     Smoking status: Former Smoker     Packs/day: 1.50     Years: 10.00     Types: Cigarettes     Smokeless tobacco: Never Used     Alcohol use Yes      Comment: 2 beers weekly       ALLERGIES:     Allergies   Allergen Reactions     Nkda [No Known Drug Allergies]        MEDICATIONS:       (Not in a hospital admission)    No current outpatient prescriptions on file.       No current [facility]-ordered outpatient prescriptions on file.     Current Facility-Administered Medications Ordered in Epic   Medication Dose Route Frequency Provider Last Rate Last Dose     lidocaine BUFFERED 1 % injection 1 mL  1 mL Other Q1H PRN Thor Garcia MD         lidocaine (LMX4) kit   Topical Q1H PRN Thor Garcia MD         sodium chloride (PF) 0.9% PF flush 3 mL  3 mL Intracatheter Q1H PRN Thor Garcia MD         lactated ringers infusion   Intravenous Continuous Thor Garcia MD           PHYSICAL EXAM:    Vitals: T 98.1, P Data Unavailable, /89, R 16, SpO2 94%, Weight   Wt Readings from Last 2 Encounters:   03/30/18 91.6 kg (202 lb)   03/29/18 91.8 kg (202 lb 6.4 oz)       See doc flowsheet    Temp: 36.7  C (98.1  F) Temp  Min: 36.6  C (97.9  F)   Max: 36.7  C (98.1  F)    NPO STATUS: see doc flowsheet    LABS:    BMP:  Recent Labs   Lab Test  04/19/17   0836   NA  140   POTASSIUM  3.9   CHLORIDE  106   CO2  28   BUN  20   CR  0.99   GLC  87   JORDAN  8.9       LFTs:   No results for input(s): PROTTOTAL, ALBUMIN, BILITOTAL, ALKPHOS, AST, ALT, BILIDIRECT in the last 57218 hours.    CBC:   Recent Labs   Lab Test  03/27/18   1249   WBC  12.2*   RBC  6.45*   HGB  18.5*   HCT  56.1*   MCV  87   MCH  28.7   MCHC  33.0   RDW  13.9   PLT  527*       Coags:  No results for input(s): INR, PTT, FIBR in the last 01579 hours.    Imaging:  No orders to display       Thor Garcia MD  Anesthesiology Staff  Pager [phone]    3/30/2018  10:23 AM                      .

## 2018-03-30 NOTE — IP AVS SNAPSHOT
[facility]    [address] [place] [id number]    Phone:  [phone]    Fax:  [phone]                                       After Visit Summary   3/30/2018    John Kinsey    MRN: [id number]           After Visit Summary Signature Page     I have received my discharge instructions, and my questions have been answered. I have discussed any challenges I see with this plan with the nurse or doctor.    ..........................................................................................................................................  Patient/Patient Representative Signature      ..........................................................................................................................................  Patient Representative Print Name and Relationship to Patient    ..................................................               ................................................  Date                                            Time    ..........................................................................................................................................  Reviewed by Signature/Title    ...................................................              ..............................................  Date                                                            Time

## 2018-03-30 NOTE — IP AVS SNAPSHOT
MRN:[id number]                      After Visit Summary   3/30/2018    John Kinsey    MRN: [id number]           Thank you!     Thank you for choosing [facility] for your care. Our goal is always to provide you with excellent care. Hearing back from our patients is one way we can continue to improve our services. Please take a few minutes to complete the written survey that you may receive in the mail after you visit with us. Thank you!        Patient Information     Date Of Birth          1960        About your hospital stay     You were admitted on:  March 30, 2018 You last received care in the:  [facility]    You were discharged on:  March 30, 2018       Who to Call     For medical emergencies, please call 911.  For non-urgent questions about your medical care, please call your primary care provider or clinic, [phone]  For questions related to your surgery, please call your surgery clinic        Attending Provider     Provider Specialty    Francisco J Goldberg MD Colon and Rectal Surgery       Primary Care Provider Office Phone # Fax #    Indira Maya [phone]      Further instructions from your care team       [facility]  Home Care Following Anesthesia  For 24 hours after surgery:  1. Get plenty of rest.  A responsible adult must stay with you for at least 24 hours after you leave the surgery center.  2. Do not drive or use heavy equipment.  If you have weakness or tingling, don't drive or use heavy equipment until this feeling goes away.   3. Do not drink alcohol.   4. Avoid strenuous or risky activities.  Ask for help when climbing stairs.  5. You may feel lightheaded.  IF so, sit for a few minutes before standing.  Have someone help you get up.   6. If you have nausea (feel sick to your stomach): Drink only clear liquids such as apple juice, ginger ale, broth or 7-Up.  Rest may also help.  Be sure to  drink enough fluids.  Move to a regular diet as you feel able.   7. You may have a slight fever.  Call the doctor if your fever is over 100 F (37.7 C) (taken under the tongue) or lasts longer than 24 hours.  8. You may have a dry mouth, a sore throat, muscle aches or trouble sleeping. These should go away after 24 hours.  9. Do not make important or legal decisions.               Tips for taking pain medications  To get the best pain relief possible, remember these points:    Take pain medications as directed, before pain becomes severe.    Pain medication can upset your stomach: taking it with food may help.    Constipation is a common side effect of pain medication. Drink plenty of  fluids.    Eat foods high in fiber. Take a stool softener if recommended by your doctor or pharmacist.    Do not drink alcohol, drive or operate machinery while taking pain medications.    Ask about other ways to control pain, such as with heat, ice or relaxation.    Tylenol/Acetaminophen Consumption  To help encourage the safe use of acetaminophen, the makers of TYLENOL  have lowered the maximum daily dose for single-ingredient Extra Strength TYLENOL  (acetaminophen) products sold in the [place] from 8 pills per day (4,000 mg) to 6 pills per day (3,000 mg). The dosing interval has also changed from 2 pills every 4-6 hours to 2 pills every 6 hours.    If you feel your pain relief is insufficient, you may take Tylenol/Acetaminophen in addition to your narcotic pain medication.     Be careful not to exceed 3,000 mg of Tylenol/Acetaminophen in a 24 hour period from all sources.    If you are taking extra strength Tylenol/acetaminophen (500 mg), the maximum dose is 6 tablets in 24 hours.    If you are taking regular strength acetaminophen (325 mg), the maximum dose is 9 tablets in 24 hours.    Call a doctor for any of the followin. Signs of infection (fever, growing tenderness at the surgery site, a large amount of drainage or bleeding,  "severe pain, foul-smelling drainage, redness, swelling).  2. It has been over 8 to 10 hours since surgery and you are still not able to urinate (pass water).  3. Headache for over 24 hours.  4. Numbness, tingling or weakness the day after surgery (if you had spinal anesthesia).  Your doctor is:       Dr. Francisco J Goldberg, Colon Rectal: [phone]               Or dial [phone] and ask for the resident on call for:  Colon Rectal  For emergency care, call the:  [facility]:  [phone] (TTY for hearing impaired: [phone])                Pending Results     Date and Time Order Name Status Description    3/30/2018 1243 Surgical pathology exam In process             Admission Information     Date & Time Provider Department Dept. Phone    3/30/2018 Francisco J Goldberg MD [facility] [phone]      Your Vitals Were     Blood Pressure Temperature Respirations Height Weight Pulse Oximetry    138/89 98.1  F (36.7  C) (Oral) 16 1.854 m (6' 1\") 91.6 kg (202 lb) 95%    BMI (Body Mass Index)                   26.65 kg/m2           MyChart Information     [employer] is an electronic gateway that provides easy, online access to your medical records. With [employer], you can request a clinic appointment, read your test results, renew a prescription or communicate with your care team.     To sign up for [employer] visit the website at [email or web address]   You will be asked to enter the access code listed below, as well as some personal information. Please follow the directions to create your username and password.     Your access code is: [email or web address]  Expires: 2018  6:30 AM     Your access code will  in 90 days. If you need help or a new code, please contact your [facility] or call [phone] for assistance.        Care EveryWhere ID     This is your Care EveryWhere ID. This could be used by other organizations to access your [facility] " medical records  [id number]        Equal Access to Services     JUDSON DIAS : Scott Tapia, kat blank, yamil walters, loy saldana. So [facility] [phone].    ATENCIÓN: Si habla español, tiene a mccormack disposición servicios gratuitos de asistencia lingüística. Llame al [phone].    We comply with applicable federal civil rights laws and [place] laws. We do not discriminate on the basis of race, color, national origin, age, disability, sex, sexual orientation, or gender identity.               Review of your medicines      Notice     You have not been prescribed any medications.             Protect others around you: Learn how to safely use, store and throw away your medicines at www.disposemymeds.org.             Medication List: This is a list of all your medications and when to take them. Check marks below indicate your daily home schedule. Keep this list as a reference.      Notice     You have not been prescribed any medications.

## 2018-03-30 NOTE — ANESTHESIA POSTPROCEDURE EVALUATION
Patient: John Kinsey    Procedure(s):  Exam Under Anesthesia Anus and Colonoscopy with biopsies, excision perianal skin lesion.   - Wound Class: II-Clean Contaminated    Diagnosis:Anal Fistula  Diagnosis Additional Information: No value filed.    Anesthesia Type:  MAC    Note:  Anesthesia Post Evaluation    Patient location during evaluation: Phase 2  Patient participation: Able to fully participate in evaluation  Level of consciousness: awake and alert  Pain management: adequate  Airway patency: patent  Cardiovascular status: acceptable  Respiratory status: acceptable  Hydration status: acceptable  PONV: none     Anesthetic complications: None          Last vitals:  Vitals:    03/30/18 1400 03/30/18 1415 03/30/18 1417   BP: 131/83  135/75   Pulse: 88  79   Resp: 16  17   Temp:      SpO2:  95%          Electronically Signed By: Thor Garcia MD  March 30, 2018

## 2018-03-30 NOTE — OP NOTE
Procedure Date: 03/30/2018      PREOPERATIVE DIAGNOSIS:  Rectal bleeding.  Anal pain.      PROCEDURE:  Colonoscopy to ileum with biopsies and polypectomy.      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 Haider Deleon in 2013.  More recently, he underwent a hemorrhoidectomy for anal pain without relief.  He presented to clinic with complaints of rectal bleeding and ongoing anal pain.  Office examination showed an area of induration in the left posterolateral position adjacent to the anal canal.  We obtained a pelvic MRI that showed mild inflammation in the distal rectum, but no evidence of any fistula tracts.  I advised examination under anesthesia as well as a colonoscopy.  I discussed all the risks and alternatives.  I answered all the patient's questions to his stated satisfaction.  He expressed his understanding and provided informed consent.      SURGEON:  Francisco J Goldberg MD      ASSISTANT:  Juve Gamboa MD       DESCRIPTION OF PROCEDURE:  The patient was brought to the operating room and positioned in the left lateral decubitus position on the gurney.  A time-out was performed.  A colonoscopy was performed under monitored anesthesia care without difficulty to the cecum and into the terminal ileum.  Photodocumentation was obtained via the [employer] system as the colonoscope was not connected to [employer].  The colonoscope was advanced without difficulty to the terminal ileum.  The ileum was inspected and was entirely normal.  In the cul-de-sac of the cecum, there was a 4 mm, sessile polyp.  This was removed with cold biopsy snare.  The ileocecal valve was normal.  The preparation was good with small puddles of residual liquid that could be suctioned away.  In the sigmoid colon, beginning at about 25 cm, there were edematous tissue folds with punctate submucosal hemorrhage  that occurred at intervals down to the level of the rectum.  All of the intervening mucosa was normal.  The inflamed folds were not friable, and there were no ulcerations or erosions.  Multiple biopsies were obtained.  Retroflexion in the rectum demonstrated minimal inflammation in the distal-most rectum.  This was characterized on straight view by mild erythema and tiny superficial erosions.  This was biopsied as well.  This minimal inflammation was patchy and in the distal-most anus, extending maybe 3 cm proximal of the dentate line.  The patient was next repositioned in the prone jackknife position, and the buttocks were taped apart.  The perianal skin was prepped and draped in a sterile fashion.  A time-out was repeated.  Local infiltration of 0.25% Marcaine with epinephrine was utilized, and a satisfactory perianal block was obtained.  Examination under anesthesia demonstrated a quiescent pilonidal overlying the cathi cleft.  There was a small depression in the area, but this was not probe patent, and there was no surrounding erythema or induration.  The anal canal was normal to palpation with the exception of a palpable left-sided sphincter defect from the former fistulotomy with some irregular skin around the edges, but nothing suggesting pathologic change.  Anoscopy was essentially normal with perhaps mildly enlarged hemorrhoids, but no other clear abnormalities.  The inflammation noted on colonoscopy was a minimal amount so as not to be grossly evident without the magnified view.  There was an area of induration in the left posterolateral position.  This lay immediately adjacent to the anal canal and extended outward for approximately 2 cm.  There were no fistula openings in this area.  We carefully probed the area and could find no tracks whatsoever.  The area was not erythematous or fluctuant.  In the absence of any additional findings and with a negative MRI, we did not attempt any sort of therapeutic  intervention in this area.  There was a solitary wart-like structure in the right posterolateral position roughly 3-4 cm from the anal margin.  This was sharply excised and the wound cauterized.  This was sent to Pathology.  No other anal or perianal abnormalities were identified, so the procedure was terminated.  Estimated blood loss was minimal.  Sponge, needle and instrument counts were reported as correct at the conclusion of the case x2.  There were no immediate operative complications.         DOMONIQUE CHAVARRIA MD             D: 2018   T: 2018   MT: pablo      Name:     SHAGGY HAYDEN   MRN:      [id number]        Account:        [id number]   :      1960           Procedure Date: 2018      Document: [id number]       cc: Indira Maya MD       [facility] Surgery Billing        EMILIANO GARCIA MD

## 2018-03-30 NOTE — TELEPHONE ENCOUNTER
Pt calling regarding surgery with Dr. Goldberg today. States he is very upset because he drove 750 miles to have the bump on his tailbone removed and they did not do anything for it during surgery. Pt states he came with the bump and is leaving with it and does not know how he should proceed. Pt requesting to speak with Yumiko Brunson, ABLARO. Explained to pt that per Yumiko she cannot explain what was done in the procedure or what the follow up plan is until she speaks with Dr. Goldberg. She can call him back early next week when she has been able to do so or he can have Dr. Goldberg paged and speak with him personally. Pt states he just spoke with Dr. Goldberg and was told that nothing was found and nothing needed to be done for the area. Pt states he will await call from Yumiko. Marcy BUSH LPN

## 2018-04-02 ENCOUNTER — TELEPHONE (OUTPATIENT)
Dept: SURGERY | Facility: CLINIC | Age: 58
End: 2018-04-02

## 2018-04-02 NOTE — TELEPHONE ENCOUNTER
Call to check on patient after his procedure on Friday.  He states that he is doing well.  He denies any pain.  He is having normal bowel movements.  He is worried that when he starts riding his tractor again that his pain will return.  He can either be seen locally by his primary care provider or we would be happy to see him here again if this does occur.  We will follow-up with him with the results of the biopsy but encouraged him to contact the clinic in the meantime with any questions or concerns.  He can follow-up with our clinic as needed.    BERNARDO Yepez, NP-C  Colon and Rectal Surgery  [facility]

## 2018-04-03 ENCOUNTER — TELEPHONE (OUTPATIENT)
Dept: SURGERY | Facility: CLINIC | Age: 58
End: 2018-04-03

## 2018-04-03 LAB — COPATH REPORT: NORMAL

## 2018-04-03 NOTE — TELEPHONE ENCOUNTER
Called to discuss pathology results. Recommend repeat colonoscopy in 5 years. Patient's questions were answered to his stated satisfaction and he is in agreement with this plan.    BERNARDO Yepez, NP-C  Colon and Rectal Surgery  [facility]

## 2022-10-11 NOTE — TELEPHONE ENCOUNTER
"Patient wanted surgery without seeing me for preop appt. Stated he would be seen by PCP and just wanted to schedue surgery.  I had done prior fistulotomies (amoung other surgeries) for patient as he has hidraenitis.  Patient stated he  \"had the same thing as before\".  In preop he stated his pain was on the left side.  During surgery, no fistula or abscess could be located.  Patient did have an enlarged external hemorrhoid in that location which is why I removed it.  A rectal exam under anesthesia includes correcting any abnormalities found.  The pain from a hemorrhoidectomy is the same pain that would have been from a fistulotomy.  The patient needs reassurance, that this will heal and soaking the area in warm water daily while using judicious pain medications is the best treatment.  The pt has always been polite to me and I don't understand why he would hang up on my staff.  He may need to find another surgeon for any future procedures.    Haider Mcneil MD   "
Called Provider on cell phone.   YAMEL from Provider, apology for not seeing his message earlier.  Per provider pain medication is already the strongest he can safely provide as out patient, can take up to 12 a day but not more,  and needs to ensure other measures to prevent constipation.   Can do warm baths and per provider the discomfort is unavoidable and cannot relieve entirely when this type of surgery is done.    Patient expressed his dissatisfaction with apology and in his opinion an unsatisfactory response with advice; stated he went to into surgery for fistula and ended up with a hemmorhiodectomy that he never said he wanted taken care of:  and and hung up on MARIANN Banks RN  [facility]    
Please see note below and advise. Lidocaine?  Eva MANE RN BSN PHN  [facility]    
Pt called back asking why this has not been addressed?  Pt is VERY upset and in pain!!  I explained to him that the MD was in surgery today and we are waiting for him to address it and he hung up on me.    Claudia Mercedes  [facility] [place]    
Pt calling back to check on status of this message.     Denise Behrendt  Specialty CSS    
Reason for Call:  Other     Detailed comments: Pt had surgery on 10/13 - States his pain is really bad. Pain meds (oxycodone acetaminophen) do not seem to be working.     He wants to know if he has stitches and if they are absorbable - Please advise    PHARMACY: [facility] [place]    Phone Number Patient can be reached at: Home number on file [phone] (home)    Best Time: Any    Can we leave a detailed message on this number? YES    Call taken on 10/16/2017 at 9:27 AM by Denise Behrendt      
Alert-The patient is alert, awake and responds to voice. The patient is oriented to time, place, and person. The triage nurse is able to obtain subjective information.

## (undated) DEVICE — TAPE CLOTH ADHESIVE 3" ZONAS

## (undated) DEVICE — GOWN ISOLATION YELLOW XL NOT STERILE 3111PG

## (undated) DEVICE — BLADE KNIFE SURG 15 371115

## (undated) DEVICE — GLOVE PROTEXIS BLUE W/NEU-THERA 7.0  2D73EB70

## (undated) DEVICE — GOWN XLG DISP 9545

## (undated) DEVICE — SYR 20ML LL W/O NDL

## (undated) DEVICE — DRSG TELFA 3X8" 1238

## (undated) DEVICE — DRSG GAUZE 4X4" TRAY 6939

## (undated) DEVICE — PACK LAPAROTOMY CUSTOM LAKES

## (undated) DEVICE — ANOSCOPE PLASTIC CLEAR UNSTERILE 82420

## (undated) DEVICE — PAD CHUX UNDERPAD 30X30"

## (undated) DEVICE — NDL 22GA 1.5"

## (undated) DEVICE — TUBING SUCTION 12"X1/4" N612

## (undated) DEVICE — PANTIES MESH LG/XLG 2PK 706M2

## (undated) DEVICE — KIT CONNECTOR FOR OLYMPUS ENDOSCOPES DEFENDO 100310

## (undated) DEVICE — PREP BALL KERLIX ROUND LATEX

## (undated) DEVICE — STRAP UNIVERSAL POSITIONING 2-PIECE 4X47X76" 91-287

## (undated) DEVICE — SOL WATER IRRIG 1000ML BOTTLE 2F7114

## (undated) DEVICE — LUBRICATING JELLY 4.25OZ

## (undated) DEVICE — GLOVE PROTEXIS W/NEU-THERA 7.5  2D73TE75

## (undated) DEVICE — DRSG GAUZE 4X4" 3033

## (undated) DEVICE — ENDO TUBING CO2 SMARTCAP STERILE DISP 100145CO2EXT

## (undated) DEVICE — GOWN LG DISP 9515

## (undated) DEVICE — PREP TECHNI-CARE CHLOROXYLENOL 3% 4OZ BOTTLE C222-4ZWO

## (undated) DEVICE — SOL WATER IRRIG 500ML BOTTLE 2F7113

## (undated) DEVICE — GLOVE PROTEXIS MICRO 7.0  2D73PM70

## (undated) DEVICE — DECANTER VIAL 2006S

## (undated) DEVICE — SUCTION MANIFOLD NEPTUNE 2 SYS 1 PORT 702-025-000

## (undated) DEVICE — WIPE PREMOIST CLEANSING WASHCLOTHS 7988

## (undated) DEVICE — ENDO CAP AND TUBING STERILE FOR ENDOGATOR  100130

## (undated) DEVICE — DRSG KERLIX FLUFFS X5

## (undated) DEVICE — LINEN TOWEL PACK X5 5464

## (undated) DEVICE — ESU GROUND PAD ADULT W/CORD E7507

## (undated) DEVICE — JELLY LUBRICATING SURGILUBE 2OZ TUBE

## (undated) DEVICE — ENDO FORCEP ENDOJAW BIOPSY 2.8MMX230CM FB-220U

## (undated) DEVICE — GLOVE PROTEXIS BLUE W/NEU-THERA 7.5  2D73EB75

## (undated) DEVICE — STOCKING SLEEVE COMPRESSION CALF MED

## (undated) DEVICE — SUCTION TIP YANKAUER STR K87

## (undated) DEVICE — SU CHROMIC 3-0 SH-1 27" G182H

## (undated) DEVICE — TAPE DURAPORE 3" SILK 1538-3

## (undated) RX ORDER — LIDOCAINE HYDROCHLORIDE 20 MG/ML
INJECTION, SOLUTION EPIDURAL; INFILTRATION; INTRACAUDAL; PERINEURAL
Status: DISPENSED
Start: 2018-03-30

## (undated) RX ORDER — PROPOFOL 10 MG/ML
INJECTION, EMULSION INTRAVENOUS
Status: DISPENSED
Start: 2018-03-30

## (undated) RX ORDER — KETAMINE HYDROCHLORIDE 10 MG/ML
INJECTION INTRAMUSCULAR; INTRAVENOUS
Status: DISPENSED
Start: 2018-03-30

## (undated) RX ORDER — OXYCODONE AND ACETAMINOPHEN 5; 325 MG/1; MG/1
TABLET ORAL
Status: DISPENSED
Start: 2017-10-13

## (undated) RX ORDER — PROPOFOL 10 MG/ML
INJECTION, EMULSION INTRAVENOUS
Status: DISPENSED
Start: 2017-10-13

## (undated) RX ORDER — HYDROMORPHONE HYDROCHLORIDE 1 MG/ML
INJECTION, SOLUTION INTRAMUSCULAR; INTRAVENOUS; SUBCUTANEOUS
Status: DISPENSED
Start: 2017-10-13

## (undated) RX ORDER — DEXAMETHASONE SODIUM PHOSPHATE 4 MG/ML
INJECTION, SOLUTION INTRA-ARTICULAR; INTRALESIONAL; INTRAMUSCULAR; INTRAVENOUS; SOFT TISSUE
Status: DISPENSED
Start: 2017-04-21

## (undated) RX ORDER — BUPIVACAINE HYDROCHLORIDE 2.5 MG/ML
INJECTION, SOLUTION EPIDURAL; INFILTRATION; INTRACAUDAL
Status: DISPENSED
Start: 2018-03-30

## (undated) RX ORDER — LIDOCAINE HYDROCHLORIDE 10 MG/ML
INJECTION, SOLUTION EPIDURAL; INFILTRATION; INTRACAUDAL; PERINEURAL
Status: DISPENSED
Start: 2018-03-30

## (undated) RX ORDER — FENTANYL CITRATE 50 UG/ML
INJECTION, SOLUTION INTRAMUSCULAR; INTRAVENOUS
Status: DISPENSED
Start: 2017-04-21

## (undated) RX ORDER — ONDANSETRON 2 MG/ML
INJECTION INTRAMUSCULAR; INTRAVENOUS
Status: DISPENSED
Start: 2017-10-13

## (undated) RX ORDER — KETAMINE HYDROCHLORIDE 10 MG/ML
INJECTION, SOLUTION INTRAMUSCULAR; INTRAVENOUS
Status: DISPENSED
Start: 2017-10-13

## (undated) RX ORDER — FENTANYL CITRATE 50 UG/ML
INJECTION, SOLUTION INTRAMUSCULAR; INTRAVENOUS
Status: DISPENSED
Start: 2017-10-13

## (undated) RX ORDER — EPINEPHRINE 1 MG/ML
INJECTION, SOLUTION, CONCENTRATE INTRAVENOUS
Status: DISPENSED
Start: 2018-03-30

## (undated) RX ORDER — BUPIVACAINE HYDROCHLORIDE AND EPINEPHRINE 2.5; 5 MG/ML; UG/ML
INJECTION, SOLUTION INFILTRATION; PERINEURAL
Status: DISPENSED
Start: 2017-04-21

## (undated) RX ORDER — HYDROCODONE BITARTRATE AND ACETAMINOPHEN 5; 325 MG/1; MG/1
TABLET ORAL
Status: DISPENSED
Start: 2017-04-21

## (undated) RX ORDER — SCOLOPAMINE TRANSDERMAL SYSTEM 1 MG/1
PATCH, EXTENDED RELEASE TRANSDERMAL
Status: DISPENSED
Start: 2017-04-21

## (undated) RX ORDER — BUPIVACAINE HYDROCHLORIDE AND EPINEPHRINE 2.5; 5 MG/ML; UG/ML
INJECTION, SOLUTION INFILTRATION; PERINEURAL
Status: DISPENSED
Start: 2017-10-13

## (undated) RX ORDER — SCOLOPAMINE TRANSDERMAL SYSTEM 1 MG/1
PATCH, EXTENDED RELEASE TRANSDERMAL
Status: DISPENSED
Start: 2018-03-30

## (undated) RX ORDER — NEOSTIGMINE METHYLSULFATE 5 MG/5 ML
SYRINGE (ML) INTRAVENOUS
Status: DISPENSED
Start: 2017-04-21

## (undated) RX ORDER — PROPOFOL 10 MG/ML
INJECTION, EMULSION INTRAVENOUS
Status: DISPENSED
Start: 2017-04-21

## (undated) RX ORDER — DEXAMETHASONE SODIUM PHOSPHATE 4 MG/ML
INJECTION, SOLUTION INTRA-ARTICULAR; INTRALESIONAL; INTRAMUSCULAR; INTRAVENOUS; SOFT TISSUE
Status: DISPENSED
Start: 2017-10-13

## (undated) RX ORDER — GLYCOPYRROLATE 0.2 MG/ML
INJECTION, SOLUTION INTRAMUSCULAR; INTRAVENOUS
Status: DISPENSED
Start: 2017-04-21

## (undated) RX ORDER — ONDANSETRON 2 MG/ML
INJECTION INTRAMUSCULAR; INTRAVENOUS
Status: DISPENSED
Start: 2017-04-21

## (undated) RX ORDER — LIDOCAINE HYDROCHLORIDE 10 MG/ML
INJECTION, SOLUTION EPIDURAL; INFILTRATION; INTRACAUDAL; PERINEURAL
Status: DISPENSED
Start: 2017-04-21